# Patient Record
Sex: FEMALE | Race: OTHER | NOT HISPANIC OR LATINO
[De-identification: names, ages, dates, MRNs, and addresses within clinical notes are randomized per-mention and may not be internally consistent; named-entity substitution may affect disease eponyms.]

---

## 2021-11-03 ENCOUNTER — TRANSCRIPTION ENCOUNTER (OUTPATIENT)
Age: 61
End: 2021-11-03

## 2021-11-24 ENCOUNTER — APPOINTMENT (OUTPATIENT)
Dept: FAMILY MEDICINE | Facility: CLINIC | Age: 61
End: 2021-11-24

## 2023-05-03 ENCOUNTER — EMERGENCY (EMERGENCY)
Facility: HOSPITAL | Age: 63
LOS: 1 days | Discharge: HOME | End: 2023-05-03
Attending: EMERGENCY MEDICINE
Payer: MEDICAID

## 2023-05-03 VITALS
DIASTOLIC BLOOD PRESSURE: 74 MMHG | HEART RATE: 80 BPM | SYSTOLIC BLOOD PRESSURE: 122 MMHG | OXYGEN SATURATION: 99 % | TEMPERATURE: 98 F | WEIGHT: 207.9 LBS | HEIGHT: 64 IN | RESPIRATION RATE: 20 BRPM

## 2023-05-03 VITALS
DIASTOLIC BLOOD PRESSURE: 80 MMHG | HEART RATE: 76 BPM | TEMPERATURE: 98 F | SYSTOLIC BLOOD PRESSURE: 128 MMHG | RESPIRATION RATE: 18 BRPM | OXYGEN SATURATION: 98 %

## 2023-05-03 LAB
ALBUMIN SERPL ELPH-MCNC: 3.6 G/DL — SIGNIFICANT CHANGE UP (ref 3.3–5.2)
ALP SERPL-CCNC: 167 U/L — HIGH (ref 40–120)
ALT FLD-CCNC: 183 U/L — HIGH
ANION GAP SERPL CALC-SCNC: 11 MMOL/L — SIGNIFICANT CHANGE UP (ref 5–17)
AST SERPL-CCNC: 186 U/L — HIGH
BASOPHILS # BLD AUTO: 0.01 K/UL — SIGNIFICANT CHANGE UP (ref 0–0.2)
BASOPHILS NFR BLD AUTO: 0.1 % — SIGNIFICANT CHANGE UP (ref 0–2)
BILIRUB SERPL-MCNC: 4.4 MG/DL — HIGH (ref 0.4–2)
BUN SERPL-MCNC: 14.2 MG/DL — SIGNIFICANT CHANGE UP (ref 8–20)
CALCIUM SERPL-MCNC: 9.2 MG/DL — SIGNIFICANT CHANGE UP (ref 8.4–10.5)
CHLORIDE SERPL-SCNC: 105 MMOL/L — SIGNIFICANT CHANGE UP (ref 96–108)
CO2 SERPL-SCNC: 22 MMOL/L — SIGNIFICANT CHANGE UP (ref 22–29)
CREAT SERPL-MCNC: 0.95 MG/DL — SIGNIFICANT CHANGE UP (ref 0.5–1.3)
EGFR: 67 ML/MIN/1.73M2 — SIGNIFICANT CHANGE UP
EOSINOPHIL # BLD AUTO: 0.24 K/UL — SIGNIFICANT CHANGE UP (ref 0–0.5)
EOSINOPHIL NFR BLD AUTO: 2.7 % — SIGNIFICANT CHANGE UP (ref 0–6)
GLUCOSE SERPL-MCNC: 96 MG/DL — SIGNIFICANT CHANGE UP (ref 70–99)
HCT VFR BLD CALC: 40.2 % — SIGNIFICANT CHANGE UP (ref 34.5–45)
HGB BLD-MCNC: 12.9 G/DL — SIGNIFICANT CHANGE UP (ref 11.5–15.5)
IMM GRANULOCYTES NFR BLD AUTO: 0.3 % — SIGNIFICANT CHANGE UP (ref 0–0.9)
LIDOCAIN IGE QN: 232 U/L — HIGH (ref 22–51)
LYMPHOCYTES # BLD AUTO: 0.71 K/UL — LOW (ref 1–3.3)
LYMPHOCYTES # BLD AUTO: 8 % — LOW (ref 13–44)
MCHC RBC-ENTMCNC: 27.7 PG — SIGNIFICANT CHANGE UP (ref 27–34)
MCHC RBC-ENTMCNC: 32.1 GM/DL — SIGNIFICANT CHANGE UP (ref 32–36)
MCV RBC AUTO: 86.5 FL — SIGNIFICANT CHANGE UP (ref 80–100)
MONOCYTES # BLD AUTO: 0.44 K/UL — SIGNIFICANT CHANGE UP (ref 0–0.9)
MONOCYTES NFR BLD AUTO: 5 % — SIGNIFICANT CHANGE UP (ref 2–14)
NEUTROPHILS # BLD AUTO: 7.42 K/UL — HIGH (ref 1.8–7.4)
NEUTROPHILS NFR BLD AUTO: 83.9 % — HIGH (ref 43–77)
PLATELET # BLD AUTO: 234 K/UL — SIGNIFICANT CHANGE UP (ref 150–400)
POTASSIUM SERPL-MCNC: 4.1 MMOL/L — SIGNIFICANT CHANGE UP (ref 3.5–5.3)
POTASSIUM SERPL-SCNC: 4.1 MMOL/L — SIGNIFICANT CHANGE UP (ref 3.5–5.3)
PROT SERPL-MCNC: 7 G/DL — SIGNIFICANT CHANGE UP (ref 6.6–8.7)
RBC # BLD: 4.65 M/UL — SIGNIFICANT CHANGE UP (ref 3.8–5.2)
RBC # FLD: 14.7 % — HIGH (ref 10.3–14.5)
SODIUM SERPL-SCNC: 138 MMOL/L — SIGNIFICANT CHANGE UP (ref 135–145)
WBC # BLD: 8.85 K/UL — SIGNIFICANT CHANGE UP (ref 3.8–10.5)
WBC # FLD AUTO: 8.85 K/UL — SIGNIFICANT CHANGE UP (ref 3.8–10.5)

## 2023-05-03 PROCEDURE — 36415 COLL VENOUS BLD VENIPUNCTURE: CPT

## 2023-05-03 PROCEDURE — 96375 TX/PRO/DX INJ NEW DRUG ADDON: CPT

## 2023-05-03 PROCEDURE — 93005 ELECTROCARDIOGRAM TRACING: CPT

## 2023-05-03 PROCEDURE — 96374 THER/PROPH/DIAG INJ IV PUSH: CPT | Mod: XU

## 2023-05-03 PROCEDURE — 74177 CT ABD & PELVIS W/CONTRAST: CPT | Mod: ME

## 2023-05-03 PROCEDURE — 74177 CT ABD & PELVIS W/CONTRAST: CPT | Mod: 26,ME

## 2023-05-03 PROCEDURE — 85025 COMPLETE CBC W/AUTO DIFF WBC: CPT

## 2023-05-03 PROCEDURE — 96361 HYDRATE IV INFUSION ADD-ON: CPT

## 2023-05-03 PROCEDURE — 82962 GLUCOSE BLOOD TEST: CPT

## 2023-05-03 PROCEDURE — 83690 ASSAY OF LIPASE: CPT

## 2023-05-03 PROCEDURE — G1004: CPT

## 2023-05-03 PROCEDURE — 80053 COMPREHEN METABOLIC PANEL: CPT

## 2023-05-03 PROCEDURE — 93010 ELECTROCARDIOGRAM REPORT: CPT

## 2023-05-03 PROCEDURE — 99284 EMERGENCY DEPT VISIT MOD MDM: CPT

## 2023-05-03 PROCEDURE — 99285 EMERGENCY DEPT VISIT HI MDM: CPT | Mod: 25

## 2023-05-03 RX ORDER — SODIUM CHLORIDE 9 MG/ML
1000 INJECTION INTRAMUSCULAR; INTRAVENOUS; SUBCUTANEOUS ONCE
Refills: 0 | Status: COMPLETED | OUTPATIENT
Start: 2023-05-03 | End: 2023-05-03

## 2023-05-03 RX ORDER — ONDANSETRON 8 MG/1
1 TABLET, FILM COATED ORAL
Qty: 16 | Refills: 0
Start: 2023-05-03 | End: 2023-05-06

## 2023-05-03 RX ORDER — METOCLOPRAMIDE HCL 10 MG
10 TABLET ORAL ONCE
Refills: 0 | Status: COMPLETED | OUTPATIENT
Start: 2023-05-03 | End: 2023-05-03

## 2023-05-03 RX ORDER — ACETAMINOPHEN 500 MG
1000 TABLET ORAL ONCE
Refills: 0 | Status: COMPLETED | OUTPATIENT
Start: 2023-05-03 | End: 2023-05-03

## 2023-05-03 RX ORDER — PANTOPRAZOLE SODIUM 20 MG/1
40 TABLET, DELAYED RELEASE ORAL ONCE
Refills: 0 | Status: COMPLETED | OUTPATIENT
Start: 2023-05-03 | End: 2023-05-03

## 2023-05-03 RX ADMIN — Medication 400 MILLIGRAM(S): at 12:03

## 2023-05-03 RX ADMIN — Medication 10 MILLIGRAM(S): at 11:58

## 2023-05-03 RX ADMIN — PANTOPRAZOLE SODIUM 40 MILLIGRAM(S): 20 TABLET, DELAYED RELEASE ORAL at 10:07

## 2023-05-03 RX ADMIN — SODIUM CHLORIDE 1000 MILLILITER(S): 9 INJECTION INTRAMUSCULAR; INTRAVENOUS; SUBCUTANEOUS at 10:07

## 2023-05-03 NOTE — ED ADULT NURSE NOTE - OBJECTIVE STATEMENT
Pt c/o epigastric pain accompanied by nausea, vomiting, diarrhea.  States several sick contacts at home.

## 2023-05-03 NOTE — ED PROVIDER NOTE - DIFFERENTIAL DIAGNOSIS
Differential Diagnosis gastritis, pancreatitis, appendicitis, cholecystitis, colitis, electrolyte imbalance, anemia

## 2023-05-03 NOTE — ED PROVIDER NOTE - PATIENT PORTAL LINK FT
You can access the FollowMyHealth Patient Portal offered by [facility] by registering at the following website: [email or web address]. By joining [facility]’s FollowMyHealth portal, you will also be able to view your health information using other applications (apps) compatible with our system.

## 2023-05-03 NOTE — ED PROVIDER NOTE - NSFOLLOWUPINSTRUCTIONS_ED_ALL_ED_FT
Continue with Clear fluids.   Toast , Banana and soups   Followup with Primary Care Physician as discussed

## 2023-05-03 NOTE — ED PROVIDER NOTE - NS ED ATTENDING STATEMENT MOD
This was a shared visit with the JUNI. I reviewed and verified the documentation and independently performed the documented:

## 2023-05-03 NOTE — ED PROVIDER NOTE - NSICDXPASTSURGICALHX_GEN_ALL_CORE_FT
PAST SURGICAL HISTORY:  S/P Arthroscopic Surgery of Left Knee     S/P Breast Biopsy, Left     S/P D&C

## 2023-05-03 NOTE — ED ADULT TRIAGE NOTE - CHIEF COMPLAINT QUOTE
Patient with nausea/vomiting and diarrhea x2 days, known stomach bug at home. c/o epigastric discomfort.

## 2024-08-18 ENCOUNTER — EMERGENCY (EMERGENCY)
Facility: HOSPITAL | Age: 64
LOS: 1 days | Discharge: HOME | End: 2024-08-18
Attending: EMERGENCY MEDICINE
Payer: MEDICARE

## 2024-08-18 VITALS
TEMPERATURE: 98 F | SYSTOLIC BLOOD PRESSURE: 139 MMHG | OXYGEN SATURATION: 97 % | HEART RATE: 98 BPM | DIASTOLIC BLOOD PRESSURE: 86 MMHG | RESPIRATION RATE: 19 BRPM

## 2024-08-18 VITALS
SYSTOLIC BLOOD PRESSURE: 146 MMHG | RESPIRATION RATE: 18 BRPM | HEART RATE: 91 BPM | DIASTOLIC BLOOD PRESSURE: 86 MMHG | TEMPERATURE: 98 F | OXYGEN SATURATION: 96 %

## 2024-08-18 LAB
ALBUMIN SERPL ELPH-MCNC: 3.6 G/DL — SIGNIFICANT CHANGE UP (ref 3.3–5.2)
ALP SERPL-CCNC: 99 U/L — SIGNIFICANT CHANGE UP (ref 40–120)
ALT FLD-CCNC: 10 U/L — SIGNIFICANT CHANGE UP
ANION GAP SERPL CALC-SCNC: 10 MMOL/L — SIGNIFICANT CHANGE UP (ref 5–17)
APTT BLD: 31.4 SEC — SIGNIFICANT CHANGE UP (ref 24.5–35.6)
AST SERPL-CCNC: 18 U/L — SIGNIFICANT CHANGE UP
BASOPHILS # BLD AUTO: 0.03 K/UL — SIGNIFICANT CHANGE UP (ref 0–0.2)
BASOPHILS NFR BLD AUTO: 0.4 % — SIGNIFICANT CHANGE UP (ref 0–2)
BILIRUB SERPL-MCNC: 0.4 MG/DL — SIGNIFICANT CHANGE UP (ref 0.4–2)
BUN SERPL-MCNC: 11.1 MG/DL — SIGNIFICANT CHANGE UP (ref 8–20)
CALCIUM SERPL-MCNC: 9.3 MG/DL — SIGNIFICANT CHANGE UP (ref 8.4–10.5)
CHLORIDE SERPL-SCNC: 107 MMOL/L — SIGNIFICANT CHANGE UP (ref 96–108)
CO2 SERPL-SCNC: 22 MMOL/L — SIGNIFICANT CHANGE UP (ref 22–29)
CREAT SERPL-MCNC: 0.86 MG/DL — SIGNIFICANT CHANGE UP (ref 0.5–1.3)
EGFR: 75 ML/MIN/1.73M2 — SIGNIFICANT CHANGE UP
EGFR: 75 ML/MIN/1.73M2 — SIGNIFICANT CHANGE UP
EOSINOPHIL # BLD AUTO: 0.25 K/UL — SIGNIFICANT CHANGE UP (ref 0–0.5)
EOSINOPHIL NFR BLD AUTO: 3.4 % — SIGNIFICANT CHANGE UP (ref 0–6)
FLUAV AG NPH QL: SIGNIFICANT CHANGE UP
FLUBV AG NPH QL: SIGNIFICANT CHANGE UP
GLUCOSE SERPL-MCNC: 83 MG/DL — SIGNIFICANT CHANGE UP (ref 70–99)
HCT VFR BLD CALC: 40.5 % — SIGNIFICANT CHANGE UP (ref 34.5–45)
HGB BLD-MCNC: 12.6 G/DL — SIGNIFICANT CHANGE UP (ref 11.5–15.5)
IMM GRANULOCYTES NFR BLD AUTO: 0.3 % — SIGNIFICANT CHANGE UP (ref 0–0.9)
INR BLD: 1.1 RATIO — SIGNIFICANT CHANGE UP (ref 0.85–1.18)
LYMPHOCYTES # BLD AUTO: 2.28 K/UL — SIGNIFICANT CHANGE UP (ref 1–3.3)
LYMPHOCYTES # BLD AUTO: 30.6 % — SIGNIFICANT CHANGE UP (ref 13–44)
MCHC RBC-ENTMCNC: 26.8 PG — LOW (ref 27–34)
MCHC RBC-ENTMCNC: 31.1 GM/DL — LOW (ref 32–36)
MCV RBC AUTO: 86 FL — SIGNIFICANT CHANGE UP (ref 80–100)
MONOCYTES # BLD AUTO: 0.47 K/UL — SIGNIFICANT CHANGE UP (ref 0–0.9)
MONOCYTES NFR BLD AUTO: 6.3 % — SIGNIFICANT CHANGE UP (ref 2–14)
NEUTROPHILS # BLD AUTO: 4.41 K/UL — SIGNIFICANT CHANGE UP (ref 1.8–7.4)
NEUTROPHILS NFR BLD AUTO: 59 % — SIGNIFICANT CHANGE UP (ref 43–77)
NT-PROBNP SERPL-SCNC: 79 PG/ML — SIGNIFICANT CHANGE UP (ref 0–300)
PLATELET # BLD AUTO: 249 K/UL — SIGNIFICANT CHANGE UP (ref 150–400)
POTASSIUM SERPL-MCNC: 4.6 MMOL/L — SIGNIFICANT CHANGE UP (ref 3.5–5.3)
POTASSIUM SERPL-SCNC: 4.6 MMOL/L — SIGNIFICANT CHANGE UP (ref 3.5–5.3)
PROT SERPL-MCNC: 7.3 G/DL — SIGNIFICANT CHANGE UP (ref 6.6–8.7)
PROTHROM AB SERPL-ACNC: 12.2 SEC — SIGNIFICANT CHANGE UP (ref 9.5–13)
RBC # BLD: 4.71 M/UL — SIGNIFICANT CHANGE UP (ref 3.8–5.2)
RBC # FLD: 13.9 % — SIGNIFICANT CHANGE UP (ref 10.3–14.5)
RSV RNA NPH QL NAA+NON-PROBE: SIGNIFICANT CHANGE UP
SARS-COV-2 RNA SPEC QL NAA+PROBE: SIGNIFICANT CHANGE UP
SODIUM SERPL-SCNC: 139 MMOL/L — SIGNIFICANT CHANGE UP (ref 135–145)
TROPONIN T, HIGH SENSITIVITY RESULT: 11 NG/L — SIGNIFICANT CHANGE UP (ref 0–51)
WBC # BLD: 7.46 K/UL — SIGNIFICANT CHANGE UP (ref 3.8–10.5)
WBC # FLD AUTO: 7.46 K/UL — SIGNIFICANT CHANGE UP (ref 3.8–10.5)

## 2024-08-18 PROCEDURE — 84484 ASSAY OF TROPONIN QUANT: CPT

## 2024-08-18 PROCEDURE — 93005 ELECTROCARDIOGRAM TRACING: CPT

## 2024-08-18 PROCEDURE — 93970 EXTREMITY STUDY: CPT

## 2024-08-18 PROCEDURE — 85025 COMPLETE CBC W/AUTO DIFF WBC: CPT

## 2024-08-18 PROCEDURE — 71046 X-RAY EXAM CHEST 2 VIEWS: CPT

## 2024-08-18 PROCEDURE — 87637 SARSCOV2&INF A&B&RSV AMP PRB: CPT

## 2024-08-18 PROCEDURE — 94640 AIRWAY INHALATION TREATMENT: CPT

## 2024-08-18 PROCEDURE — 36415 COLL VENOUS BLD VENIPUNCTURE: CPT

## 2024-08-18 PROCEDURE — 85730 THROMBOPLASTIN TIME PARTIAL: CPT

## 2024-08-18 PROCEDURE — 96374 THER/PROPH/DIAG INJ IV PUSH: CPT

## 2024-08-18 PROCEDURE — 80053 COMPREHEN METABOLIC PANEL: CPT

## 2024-08-18 PROCEDURE — 83880 ASSAY OF NATRIURETIC PEPTIDE: CPT

## 2024-08-18 PROCEDURE — 99285 EMERGENCY DEPT VISIT HI MDM: CPT | Mod: 25

## 2024-08-18 PROCEDURE — 85610 PROTHROMBIN TIME: CPT

## 2024-08-18 PROCEDURE — 71046 X-RAY EXAM CHEST 2 VIEWS: CPT | Mod: 26

## 2024-08-18 PROCEDURE — 93970 EXTREMITY STUDY: CPT | Mod: 26

## 2024-08-18 PROCEDURE — 96375 TX/PRO/DX INJ NEW DRUG ADDON: CPT

## 2024-08-18 PROCEDURE — 93010 ELECTROCARDIOGRAM REPORT: CPT

## 2024-08-18 PROCEDURE — 99285 EMERGENCY DEPT VISIT HI MDM: CPT

## 2024-08-18 RX ORDER — IPRATROPIUM BROMIDE AND ALBUTEROL SULFATE .5; 2.5 MG/3ML; MG/3ML
3 SOLUTION RESPIRATORY (INHALATION) EVERY 6 HOURS
Refills: 0 | Status: DISCONTINUED | OUTPATIENT
Start: 2024-08-18 | End: 2024-08-18

## 2024-08-18 RX ORDER — AMLODIPINE BESYLATE 10 MG/1
0 TABLET ORAL
Refills: 0 | DISCHARGE

## 2024-08-18 RX ORDER — IPRATROPIUM BROMIDE AND ALBUTEROL SULFATE .5; 2.5 MG/3ML; MG/3ML
3 SOLUTION RESPIRATORY (INHALATION) ONCE
Refills: 0 | Status: COMPLETED | OUTPATIENT
Start: 2024-08-18 | End: 2024-08-18

## 2024-08-18 RX ORDER — METOPROLOL SUCCINATE 50 MG/1
100 TABLET, EXTENDED RELEASE ORAL ONCE
Refills: 0 | Status: DISCONTINUED | OUTPATIENT
Start: 2024-08-18 | End: 2024-08-18

## 2024-08-18 RX ORDER — LISINOPRIL 5 MG/1
40 TABLET ORAL DAILY
Refills: 0 | Status: DISCONTINUED | OUTPATIENT
Start: 2024-08-18 | End: 2024-08-25

## 2024-08-18 RX ORDER — METHYLPREDNISOLONE ACETATE 80 MG/ML
0 INJECTION, SUSPENSION INTRA-ARTICULAR; INTRALESIONAL; INTRAMUSCULAR; SOFT TISSUE
Refills: 0
Start: 2024-08-18

## 2024-08-18 RX ORDER — METHYLPREDNISOLONE ACETATE 80 MG/ML
125 INJECTION, SUSPENSION INTRA-ARTICULAR; INTRALESIONAL; INTRAMUSCULAR; SOFT TISSUE ONCE
Refills: 0 | Status: COMPLETED | OUTPATIENT
Start: 2024-08-18 | End: 2024-08-18

## 2024-08-18 RX ORDER — KETOROLAC TROMETHAMINE 30 MG/ML
15 INJECTION, SOLUTION INTRAMUSCULAR; INTRAVENOUS ONCE
Refills: 0 | Status: DISCONTINUED | OUTPATIENT
Start: 2024-08-18 | End: 2024-08-18

## 2024-08-18 RX ORDER — PREDNISONE 20 MG/1
1 TABLET ORAL
Qty: 4 | Refills: 0
Start: 2024-08-18 | End: 2024-08-21

## 2024-08-18 RX ORDER — AMLODIPINE BESYLATE 10 MG/1
10 TABLET ORAL ONCE
Refills: 0 | Status: COMPLETED | OUTPATIENT
Start: 2024-08-18 | End: 2024-08-18

## 2024-08-18 RX ADMIN — METHYLPREDNISOLONE ACETATE 125 MILLIGRAM(S): 80 INJECTION, SUSPENSION INTRA-ARTICULAR; INTRALESIONAL; INTRAMUSCULAR; SOFT TISSUE at 13:03

## 2024-08-18 RX ADMIN — IPRATROPIUM BROMIDE AND ALBUTEROL SULFATE 3 MILLILITER(S): .5; 2.5 SOLUTION RESPIRATORY (INHALATION) at 13:03

## 2024-08-18 RX ADMIN — AMLODIPINE BESYLATE 10 MILLIGRAM(S): 10 TABLET ORAL at 18:04

## 2024-08-18 RX ADMIN — LISINOPRIL 40 MILLIGRAM(S): 5 TABLET ORAL at 18:04

## 2024-08-18 RX ADMIN — IPRATROPIUM BROMIDE AND ALBUTEROL SULFATE 3 MILLILITER(S): .5; 2.5 SOLUTION RESPIRATORY (INHALATION) at 16:08

## 2024-08-18 RX ADMIN — KETOROLAC TROMETHAMINE 15 MILLIGRAM(S): 30 INJECTION, SOLUTION INTRAMUSCULAR; INTRAVENOUS at 18:03

## 2024-08-19 RX ORDER — PREDNISONE 20 MG/1
2 TABLET ORAL
Qty: 10 | Refills: 0
Start: 2024-08-19 | End: 2024-08-23

## 2024-08-22 DIAGNOSIS — J45.909 UNSPECIFIED ASTHMA, UNCOMPLICATED: ICD-10-CM

## 2024-08-22 DIAGNOSIS — Z87.891 PERSONAL HISTORY OF NICOTINE DEPENDENCE: ICD-10-CM

## 2024-08-22 DIAGNOSIS — M06.9 RHEUMATOID ARTHRITIS, UNSPECIFIED: ICD-10-CM

## 2024-08-22 DIAGNOSIS — I10 ESSENTIAL (PRIMARY) HYPERTENSION: ICD-10-CM

## 2024-08-22 DIAGNOSIS — R06.02 SHORTNESS OF BREATH: ICD-10-CM

## 2024-11-16 ENCOUNTER — EMERGENCY (EMERGENCY)
Facility: HOSPITAL | Age: 64
LOS: 1 days | Discharge: HOME | End: 2024-11-16
Attending: EMERGENCY MEDICINE
Payer: MEDICARE

## 2024-11-16 VITALS
RESPIRATION RATE: 18 BRPM | HEART RATE: 94 BPM | OXYGEN SATURATION: 97 % | TEMPERATURE: 98 F | SYSTOLIC BLOOD PRESSURE: 137 MMHG | DIASTOLIC BLOOD PRESSURE: 80 MMHG

## 2024-11-16 VITALS
HEIGHT: 66 IN | WEIGHT: 175.05 LBS | SYSTOLIC BLOOD PRESSURE: 147 MMHG | OXYGEN SATURATION: 98 % | HEART RATE: 99 BPM | RESPIRATION RATE: 20 BRPM | TEMPERATURE: 98 F | DIASTOLIC BLOOD PRESSURE: 94 MMHG

## 2024-11-16 LAB
ANION GAP SERPL CALC-SCNC: 13 MMOL/L — SIGNIFICANT CHANGE UP (ref 5–17)
BASOPHILS # BLD AUTO: 0 K/UL — SIGNIFICANT CHANGE UP (ref 0–0.2)
BASOPHILS NFR BLD AUTO: 0 % — SIGNIFICANT CHANGE UP (ref 0–2)
BUN SERPL-MCNC: 11.5 MG/DL — SIGNIFICANT CHANGE UP (ref 8–20)
CALCIUM SERPL-MCNC: 9.8 MG/DL — SIGNIFICANT CHANGE UP (ref 8.4–10.5)
CHLORIDE SERPL-SCNC: 110 MMOL/L — HIGH (ref 96–108)
CO2 SERPL-SCNC: 23 MMOL/L — SIGNIFICANT CHANGE UP (ref 22–29)
CREAT SERPL-MCNC: 0.88 MG/DL — SIGNIFICANT CHANGE UP (ref 0.5–1.3)
EGFR: 73 ML/MIN/1.73M2 — SIGNIFICANT CHANGE UP
EGFR: 73 ML/MIN/1.73M2 — SIGNIFICANT CHANGE UP
EOSINOPHIL # BLD AUTO: 0 K/UL — SIGNIFICANT CHANGE UP (ref 0–0.5)
EOSINOPHIL NFR BLD AUTO: 0 % — SIGNIFICANT CHANGE UP (ref 0–6)
FLUAV AG NPH QL: SIGNIFICANT CHANGE UP
FLUBV AG NPH QL: SIGNIFICANT CHANGE UP
GLUCOSE SERPL-MCNC: 187 MG/DL — HIGH (ref 70–99)
HCT VFR BLD CALC: 40.5 % — SIGNIFICANT CHANGE UP (ref 34.5–45)
HGB BLD-MCNC: 12.5 G/DL — SIGNIFICANT CHANGE UP (ref 11.5–15.5)
LYMPHOCYTES # BLD AUTO: 0.89 K/UL — LOW (ref 1–3.3)
LYMPHOCYTES # BLD AUTO: 13.3 % — SIGNIFICANT CHANGE UP (ref 13–44)
MANUAL SMEAR VERIFICATION: SIGNIFICANT CHANGE UP
MCHC RBC-ENTMCNC: 26.7 PG — LOW (ref 27–34)
MCHC RBC-ENTMCNC: 30.9 G/DL — LOW (ref 32–36)
MCV RBC AUTO: 86.4 FL — SIGNIFICANT CHANGE UP (ref 80–100)
MONOCYTES # BLD AUTO: 0.11 K/UL — SIGNIFICANT CHANGE UP (ref 0–0.9)
MONOCYTES NFR BLD AUTO: 1.7 % — LOW (ref 2–14)
NEUTROPHILS # BLD AUTO: 5.65 K/UL — SIGNIFICANT CHANGE UP (ref 1.8–7.4)
NEUTROPHILS NFR BLD AUTO: 84.1 % — HIGH (ref 43–77)
PLAT MORPH BLD: NORMAL — SIGNIFICANT CHANGE UP
PLATELET # BLD AUTO: 278 K/UL — SIGNIFICANT CHANGE UP (ref 150–400)
POTASSIUM SERPL-MCNC: 4.5 MMOL/L — SIGNIFICANT CHANGE UP (ref 3.5–5.3)
POTASSIUM SERPL-SCNC: 4.5 MMOL/L — SIGNIFICANT CHANGE UP (ref 3.5–5.3)
RBC # BLD: 4.69 M/UL — SIGNIFICANT CHANGE UP (ref 3.8–5.2)
RBC # FLD: 14.6 % — HIGH (ref 10.3–14.5)
RBC BLD AUTO: NORMAL — SIGNIFICANT CHANGE UP
RSV RNA NPH QL NAA+NON-PROBE: SIGNIFICANT CHANGE UP
SARS-COV-2 RNA SPEC QL NAA+PROBE: SIGNIFICANT CHANGE UP
SODIUM SERPL-SCNC: 146 MMOL/L — HIGH (ref 135–145)
VARIANT LYMPHS # BLD: 0.9 % — SIGNIFICANT CHANGE UP (ref 0–6)
VARIANT LYMPHS NFR BLD MANUAL: 0.9 % — SIGNIFICANT CHANGE UP (ref 0–6)
WBC # BLD: 6.72 K/UL — SIGNIFICANT CHANGE UP (ref 3.8–10.5)
WBC # FLD AUTO: 6.72 K/UL — SIGNIFICANT CHANGE UP (ref 3.8–10.5)

## 2024-11-16 PROCEDURE — 99285 EMERGENCY DEPT VISIT HI MDM: CPT

## 2024-11-16 PROCEDURE — 93010 ELECTROCARDIOGRAM REPORT: CPT

## 2024-11-16 RX ORDER — IPRATROPIUM BROMIDE AND ALBUTEROL SULFATE .5; 2.5 MG/3ML; MG/3ML
3 SOLUTION RESPIRATORY (INHALATION)
Refills: 0 | Status: COMPLETED | OUTPATIENT
Start: 2024-11-16 | End: 2024-11-16

## 2024-11-16 RX ORDER — MAGNESIUM SULFATE 500 MG/ML
2 SYRINGE (ML) INJECTION ONCE
Refills: 0 | Status: COMPLETED | OUTPATIENT
Start: 2024-11-16 | End: 2024-11-16

## 2024-11-16 RX ORDER — METHYLPREDNISOLONE ACETATE 80 MG/ML
125 INJECTION, SUSPENSION INTRA-ARTICULAR; INTRALESIONAL; INTRAMUSCULAR; SOFT TISSUE ONCE
Refills: 0 | Status: COMPLETED | OUTPATIENT
Start: 2024-11-16 | End: 2024-11-16

## 2024-11-16 RX ADMIN — Medication 150 GRAM(S): at 22:37

## 2024-11-16 RX ADMIN — IPRATROPIUM BROMIDE AND ALBUTEROL SULFATE 3 MILLILITER(S): .5; 2.5 SOLUTION RESPIRATORY (INHALATION) at 22:37

## 2024-11-16 RX ADMIN — IPRATROPIUM BROMIDE AND ALBUTEROL SULFATE 3 MILLILITER(S): .5; 2.5 SOLUTION RESPIRATORY (INHALATION) at 23:17

## 2024-11-16 RX ADMIN — METHYLPREDNISOLONE ACETATE 125 MILLIGRAM(S): 80 INJECTION, SUSPENSION INTRA-ARTICULAR; INTRALESIONAL; INTRAMUSCULAR; SOFT TISSUE at 22:37

## 2024-11-16 RX ADMIN — Medication 2 GRAM(S): at 23:20

## 2024-11-17 PROCEDURE — 99285 EMERGENCY DEPT VISIT HI MDM: CPT | Mod: 25

## 2024-11-17 PROCEDURE — 93005 ELECTROCARDIOGRAM TRACING: CPT

## 2024-11-17 PROCEDURE — 96375 TX/PRO/DX INJ NEW DRUG ADDON: CPT

## 2024-11-17 PROCEDURE — 36415 COLL VENOUS BLD VENIPUNCTURE: CPT

## 2024-11-17 PROCEDURE — 96365 THER/PROPH/DIAG IV INF INIT: CPT

## 2024-11-17 PROCEDURE — 94640 AIRWAY INHALATION TREATMENT: CPT

## 2024-11-17 PROCEDURE — 80048 BASIC METABOLIC PNL TOTAL CA: CPT

## 2024-11-17 PROCEDURE — 71046 X-RAY EXAM CHEST 2 VIEWS: CPT | Mod: 26

## 2024-11-17 PROCEDURE — 85025 COMPLETE CBC W/AUTO DIFF WBC: CPT

## 2024-11-17 PROCEDURE — 71046 X-RAY EXAM CHEST 2 VIEWS: CPT

## 2024-11-17 PROCEDURE — 87637 SARSCOV2&INF A&B&RSV AMP PRB: CPT

## 2024-11-17 RX ORDER — LIDOCAINE HYDROCHLORIDE 20 MG/ML
5 JELLY TOPICAL ONCE
Refills: 0 | Status: COMPLETED | OUTPATIENT
Start: 2024-11-17 | End: 2024-11-17

## 2024-11-17 RX ORDER — DEXTROMETHORPHAN HBR, GUAIFENESIN 200 MG/10ML
200 LIQUID ORAL ONCE
Refills: 0 | Status: COMPLETED | OUTPATIENT
Start: 2024-11-17 | End: 2024-11-17

## 2024-11-17 RX ADMIN — Medication 4 MILLILITER(S): at 01:59

## 2024-11-17 RX ADMIN — LIDOCAINE HYDROCHLORIDE 5 MILLILITER(S): 20 JELLY TOPICAL at 01:58

## 2024-11-17 RX ADMIN — DEXTROMETHORPHAN HBR, GUAIFENESIN 200 MILLIGRAM(S): 200 LIQUID ORAL at 01:58

## 2024-11-17 RX ADMIN — Medication 10 MILLIGRAM(S): at 01:59

## 2024-11-17 RX ADMIN — Medication 1 LOZENGE: at 03:02

## 2024-11-17 RX ADMIN — IPRATROPIUM BROMIDE AND ALBUTEROL SULFATE 3 MILLILITER(S): .5; 2.5 SOLUTION RESPIRATORY (INHALATION) at 00:07

## 2025-01-09 ENCOUNTER — APPOINTMENT (OUTPATIENT)
Dept: CARDIOLOGY | Facility: CLINIC | Age: 65
End: 2025-01-09
Payer: MEDICARE

## 2025-01-09 VITALS — DIASTOLIC BLOOD PRESSURE: 90 MMHG | SYSTOLIC BLOOD PRESSURE: 140 MMHG

## 2025-01-09 VITALS
HEIGHT: 64 IN | WEIGHT: 198 LBS | OXYGEN SATURATION: 96 % | DIASTOLIC BLOOD PRESSURE: 84 MMHG | BODY MASS INDEX: 33.8 KG/M2 | HEART RATE: 93 BPM | SYSTOLIC BLOOD PRESSURE: 132 MMHG

## 2025-01-09 DIAGNOSIS — Z00.00 ENCOUNTER FOR GENERAL ADULT MEDICAL EXAMINATION W/OUT ABNORMAL FINDINGS: ICD-10-CM

## 2025-01-09 DIAGNOSIS — F41.9 ANXIETY DISORDER, UNSPECIFIED: ICD-10-CM

## 2025-01-09 DIAGNOSIS — R06.09 OTHER FORMS OF DYSPNEA: ICD-10-CM

## 2025-01-09 DIAGNOSIS — I10 ESSENTIAL (PRIMARY) HYPERTENSION: ICD-10-CM

## 2025-01-09 PROCEDURE — 99205 OFFICE O/P NEW HI 60 MIN: CPT

## 2025-01-09 PROCEDURE — G2211 COMPLEX E/M VISIT ADD ON: CPT

## 2025-01-09 PROCEDURE — 93000 ELECTROCARDIOGRAM COMPLETE: CPT

## 2025-01-09 RX ORDER — FLUTICASONE PROPIONATE 50 UG/1
50 SPRAY, METERED NASAL
Refills: 0 | Status: ACTIVE | COMMUNITY
Start: 2025-01-09

## 2025-01-09 RX ORDER — MONTELUKAST SODIUM 10 MG/1
10 TABLET, FILM COATED ORAL DAILY
Refills: 0 | Status: ACTIVE | COMMUNITY
Start: 2025-01-09

## 2025-01-09 RX ORDER — ALBUTEROL SULFATE 90 UG/1
108 (90 BASE) AEROSOL, METERED RESPIRATORY (INHALATION)
Refills: 0 | Status: ACTIVE | COMMUNITY
Start: 2025-01-09

## 2025-01-09 RX ORDER — TRAMADOL HYDROCHLORIDE 50 MG/1
50 TABLET, COATED ORAL
Refills: 0 | Status: ACTIVE | COMMUNITY
Start: 2025-01-09

## 2025-01-09 RX ORDER — AMLODIPINE AND OLMESARTAN MEDOXOMIL 5; 20 MG/1; MG/1
5-20 TABLET ORAL
Qty: 90 | Refills: 3 | Status: ACTIVE | COMMUNITY
Start: 2025-01-09 | End: 1900-01-01

## 2025-01-09 RX ORDER — ALBUTEROL SULFATE 2.5 MG/3ML
(2.5 MG/3ML) SOLUTION RESPIRATORY (INHALATION)
Refills: 0 | Status: ACTIVE | COMMUNITY
Start: 2025-01-09

## 2025-01-09 RX ORDER — FLUOXETINE HYDROCHLORIDE 20 MG/1
20 TABLET ORAL
Refills: 0 | Status: ACTIVE | COMMUNITY
Start: 2025-01-09

## 2025-01-09 RX ORDER — METOPROLOL SUCCINATE 50 MG/1
50 TABLET, EXTENDED RELEASE ORAL
Qty: 180 | Refills: 3 | Status: ACTIVE | COMMUNITY
Start: 2025-01-09

## 2025-01-09 RX ORDER — VALSARTAN 40 MG/1
40 TABLET, COATED ORAL DAILY
Qty: 90 | Refills: 1 | Status: DISCONTINUED | COMMUNITY
Start: 2025-01-09 | End: 2025-01-09

## 2025-01-09 RX ORDER — GABAPENTIN 100 MG/1
100 CAPSULE ORAL TWICE DAILY
Refills: 0 | Status: ACTIVE | COMMUNITY
Start: 2025-01-09

## 2025-01-09 RX ORDER — SUMATRIPTAN 50 MG/1
50 TABLET, FILM COATED ORAL
Refills: 0 | Status: ACTIVE | COMMUNITY
Start: 2025-01-09

## 2025-01-09 RX ORDER — OMEPRAZOLE 40 MG/1
40 CAPSULE, DELAYED RELEASE ORAL
Refills: 0 | Status: ACTIVE | COMMUNITY
Start: 2025-01-09

## 2025-01-09 RX ORDER — LORATADINE 10 MG/1
10 TABLET ORAL
Refills: 0 | Status: ACTIVE | COMMUNITY
Start: 2025-01-09

## 2025-01-11 ENCOUNTER — NON-APPOINTMENT (OUTPATIENT)
Age: 65
End: 2025-01-11

## 2025-01-22 ENCOUNTER — APPOINTMENT (OUTPATIENT)
Dept: PULMONOLOGY | Facility: CLINIC | Age: 65
End: 2025-01-22
Payer: MEDICARE

## 2025-01-22 ENCOUNTER — NON-APPOINTMENT (OUTPATIENT)
Age: 65
End: 2025-01-22

## 2025-01-22 ENCOUNTER — TRANSCRIPTION ENCOUNTER (OUTPATIENT)
Age: 65
End: 2025-01-22

## 2025-01-22 VITALS
WEIGHT: 198 LBS | OXYGEN SATURATION: 95 % | HEIGHT: 64 IN | TEMPERATURE: 97.2 F | HEART RATE: 77 BPM | DIASTOLIC BLOOD PRESSURE: 93 MMHG | SYSTOLIC BLOOD PRESSURE: 155 MMHG | BODY MASS INDEX: 33.8 KG/M2

## 2025-01-22 DIAGNOSIS — R05.9 COUGH, UNSPECIFIED: ICD-10-CM

## 2025-01-22 DIAGNOSIS — J45.41 MODERATE PERSISTENT ASTHMA WITH (ACUTE) EXACERBATION: ICD-10-CM

## 2025-01-22 PROCEDURE — 99204 OFFICE O/P NEW MOD 45 MIN: CPT | Mod: 25

## 2025-01-22 PROCEDURE — 94010 BREATHING CAPACITY TEST: CPT

## 2025-01-22 RX ORDER — ALBUTEROL SULFATE 90 UG/1
108 (90 BASE) INHALANT RESPIRATORY (INHALATION)
Qty: 2 | Refills: 6 | Status: ACTIVE | COMMUNITY
Start: 2025-01-22 | End: 1900-01-01

## 2025-01-22 RX ORDER — AZITHROMYCIN 250 MG/1
250 TABLET, FILM COATED ORAL
Qty: 6 | Refills: 2 | Status: ACTIVE | COMMUNITY
Start: 2025-01-22 | End: 1900-01-01

## 2025-01-22 RX ORDER — PREDNISONE 5 MG/1
5 TABLET ORAL
Qty: 42 | Refills: 2 | Status: ACTIVE | COMMUNITY
Start: 2025-01-22 | End: 1900-01-01

## 2025-01-24 RX ORDER — BUDESONIDE AND FORMOTEROL FUMARATE DIHYDRATE 80; 4.5 UG/1; UG/1
80-4.5 AEROSOL RESPIRATORY (INHALATION) TWICE DAILY
Qty: 3 | Refills: 2 | Status: ACTIVE | COMMUNITY
Start: 2025-01-22 | End: 1900-01-01

## 2025-02-14 ENCOUNTER — APPOINTMENT (OUTPATIENT)
Dept: CARDIOLOGY | Facility: CLINIC | Age: 65
End: 2025-02-14

## 2025-02-25 ENCOUNTER — APPOINTMENT (OUTPATIENT)
Dept: CARDIOLOGY | Facility: CLINIC | Age: 65
End: 2025-02-25
Payer: MEDICARE

## 2025-02-25 PROCEDURE — 93306 TTE W/DOPPLER COMPLETE: CPT

## 2025-02-25 PROCEDURE — A9502: CPT

## 2025-02-25 PROCEDURE — 93015 CV STRESS TEST SUPVJ I&R: CPT

## 2025-02-25 PROCEDURE — 78452 HT MUSCLE IMAGE SPECT MULT: CPT

## 2025-02-25 RX ORDER — FEXOFENADINE HCL 180 MG
180 TABLET ORAL
Refills: 0 | Status: ACTIVE | COMMUNITY

## 2025-02-25 RX ORDER — LEFLUNOMIDE 20 MG/1
20 TABLET, FILM COATED ORAL DAILY
Refills: 0 | Status: ACTIVE | COMMUNITY

## 2025-02-25 RX ORDER — ONDANSETRON 8 MG/1
8 TABLET ORAL
Refills: 0 | Status: ACTIVE | COMMUNITY

## 2025-02-25 RX ORDER — TOPIRAMATE 25 MG/1
25 TABLET, FILM COATED ORAL
Refills: 0 | Status: ACTIVE | COMMUNITY

## 2025-03-05 ENCOUNTER — APPOINTMENT (OUTPATIENT)
Dept: PULMONOLOGY | Facility: CLINIC | Age: 65
End: 2025-03-05

## 2025-03-21 ENCOUNTER — TRANSCRIPTION ENCOUNTER (OUTPATIENT)
Age: 65
End: 2025-03-21

## 2025-04-01 ENCOUNTER — APPOINTMENT (OUTPATIENT)
Dept: PULMONOLOGY | Facility: CLINIC | Age: 65
End: 2025-04-01
Payer: MEDICARE

## 2025-04-01 DIAGNOSIS — J45.41 MODERATE PERSISTENT ASTHMA WITH (ACUTE) EXACERBATION: ICD-10-CM

## 2025-04-01 DIAGNOSIS — R05.9 COUGH, UNSPECIFIED: ICD-10-CM

## 2025-04-01 PROCEDURE — 99214 OFFICE O/P EST MOD 30 MIN: CPT | Mod: 93

## 2025-04-21 ENCOUNTER — RESULT REVIEW (OUTPATIENT)
Age: 65
End: 2025-04-21

## 2025-04-21 ENCOUNTER — INPATIENT (INPATIENT)
Facility: HOSPITAL | Age: 65
LOS: 3 days | Discharge: HOME | DRG: 204 | End: 2025-04-25
Attending: STUDENT IN AN ORGANIZED HEALTH CARE EDUCATION/TRAINING PROGRAM | Admitting: STUDENT IN AN ORGANIZED HEALTH CARE EDUCATION/TRAINING PROGRAM
Payer: MEDICARE

## 2025-04-21 VITALS
DIASTOLIC BLOOD PRESSURE: 83 MMHG | OXYGEN SATURATION: 99 % | RESPIRATION RATE: 18 BRPM | TEMPERATURE: 98 F | SYSTOLIC BLOOD PRESSURE: 133 MMHG | HEART RATE: 93 BPM | HEIGHT: 64 IN | WEIGHT: 186.07 LBS

## 2025-04-21 DIAGNOSIS — Z01.810 ENCOUNTER FOR PREPROCEDURAL CARDIOVASCULAR EXAMINATION: ICD-10-CM

## 2025-04-21 DIAGNOSIS — R91.8 OTHER NONSPECIFIC ABNORMAL FINDING OF LUNG FIELD: ICD-10-CM

## 2025-04-21 DIAGNOSIS — R09.89 OTHER SPECIFIED SYMPTOMS AND SIGNS INVOLVING THE CIRCULATORY AND RESPIRATORY SYSTEMS: ICD-10-CM

## 2025-04-21 LAB
ALBUMIN SERPL ELPH-MCNC: 3.9 G/DL — SIGNIFICANT CHANGE UP (ref 3.3–5.2)
ALP SERPL-CCNC: 104 U/L — SIGNIFICANT CHANGE UP (ref 40–120)
ALT FLD-CCNC: 9 U/L — SIGNIFICANT CHANGE UP
ANION GAP SERPL CALC-SCNC: 14 MMOL/L — SIGNIFICANT CHANGE UP (ref 5–17)
APTT BLD: 34.8 SEC — SIGNIFICANT CHANGE UP (ref 24.5–35.6)
AST SERPL-CCNC: 19 U/L — SIGNIFICANT CHANGE UP
BASOPHILS # BLD AUTO: 0.05 K/UL — SIGNIFICANT CHANGE UP (ref 0–0.2)
BASOPHILS NFR BLD AUTO: 0.6 % — SIGNIFICANT CHANGE UP (ref 0–2)
BILIRUB SERPL-MCNC: 0.4 MG/DL — SIGNIFICANT CHANGE UP (ref 0.4–2)
BUN SERPL-MCNC: 11.6 MG/DL — SIGNIFICANT CHANGE UP (ref 8–20)
CALCIUM SERPL-MCNC: 9.5 MG/DL — SIGNIFICANT CHANGE UP (ref 8.4–10.5)
CHLORIDE SERPL-SCNC: 110 MMOL/L — HIGH (ref 96–108)
CO2 SERPL-SCNC: 21 MMOL/L — LOW (ref 22–29)
CREAT SERPL-MCNC: 0.78 MG/DL — SIGNIFICANT CHANGE UP (ref 0.5–1.3)
EGFR: 84 ML/MIN/1.73M2 — SIGNIFICANT CHANGE UP
EGFR: 84 ML/MIN/1.73M2 — SIGNIFICANT CHANGE UP
EOSINOPHIL # BLD AUTO: 0.26 K/UL — SIGNIFICANT CHANGE UP (ref 0–0.5)
EOSINOPHIL NFR BLD AUTO: 3 % — SIGNIFICANT CHANGE UP (ref 0–6)
GLUCOSE SERPL-MCNC: 82 MG/DL — SIGNIFICANT CHANGE UP (ref 70–99)
HCT VFR BLD CALC: 39.1 % — SIGNIFICANT CHANGE UP (ref 34.5–45)
HGB BLD-MCNC: 12.2 G/DL — SIGNIFICANT CHANGE UP (ref 11.5–15.5)
IMM GRANULOCYTES # BLD AUTO: 0.03 K/UL — SIGNIFICANT CHANGE UP (ref 0–0.07)
IMM GRANULOCYTES NFR BLD AUTO: 0.4 % — SIGNIFICANT CHANGE UP (ref 0–0.9)
INR BLD: 1.12 RATIO — SIGNIFICANT CHANGE UP (ref 0.85–1.16)
LYMPHOCYTES # BLD AUTO: 2.67 K/UL — SIGNIFICANT CHANGE UP (ref 1–3.3)
LYMPHOCYTES NFR BLD AUTO: 31.3 % — SIGNIFICANT CHANGE UP (ref 13–44)
MCHC RBC-ENTMCNC: 26.8 PG — LOW (ref 27–34)
MCHC RBC-ENTMCNC: 31.2 G/DL — LOW (ref 32–36)
MCV RBC AUTO: 85.9 FL — SIGNIFICANT CHANGE UP (ref 80–100)
MONOCYTES # BLD AUTO: 0.46 K/UL — SIGNIFICANT CHANGE UP (ref 0–0.9)
MONOCYTES NFR BLD AUTO: 5.4 % — SIGNIFICANT CHANGE UP (ref 2–14)
NEUTROPHILS # BLD AUTO: 5.07 K/UL — SIGNIFICANT CHANGE UP (ref 1.8–7.4)
NEUTROPHILS NFR BLD AUTO: 59.3 % — SIGNIFICANT CHANGE UP (ref 43–77)
NRBC # BLD AUTO: 0 K/UL — SIGNIFICANT CHANGE UP (ref 0–0)
NRBC # FLD: 0 K/UL — SIGNIFICANT CHANGE UP (ref 0–0)
NRBC BLD AUTO-RTO: 0 /100 WBCS — SIGNIFICANT CHANGE UP (ref 0–0)
PLATELET # BLD AUTO: 256 K/UL — SIGNIFICANT CHANGE UP (ref 150–400)
PMV BLD: 11.9 FL — SIGNIFICANT CHANGE UP (ref 7–13)
POTASSIUM SERPL-MCNC: 3.9 MMOL/L — SIGNIFICANT CHANGE UP (ref 3.5–5.3)
POTASSIUM SERPL-SCNC: 3.9 MMOL/L — SIGNIFICANT CHANGE UP (ref 3.5–5.3)
PROT SERPL-MCNC: 7.4 G/DL — SIGNIFICANT CHANGE UP (ref 6.6–8.7)
PROTHROM AB SERPL-ACNC: 13 SEC — SIGNIFICANT CHANGE UP (ref 9.9–13.4)
RBC # BLD: 4.55 M/UL — SIGNIFICANT CHANGE UP (ref 3.8–5.2)
RBC # FLD: 14.8 % — HIGH (ref 10.3–14.5)
SODIUM SERPL-SCNC: 145 MMOL/L — SIGNIFICANT CHANGE UP (ref 135–145)
WBC # BLD: 8.54 K/UL — SIGNIFICANT CHANGE UP (ref 3.8–10.5)
WBC # FLD AUTO: 8.54 K/UL — SIGNIFICANT CHANGE UP (ref 3.8–10.5)

## 2025-04-21 PROCEDURE — 99223 1ST HOSP IP/OBS HIGH 75: CPT

## 2025-04-21 PROCEDURE — 71275 CT ANGIOGRAPHY CHEST: CPT | Mod: 26

## 2025-04-21 PROCEDURE — 99285 EMERGENCY DEPT VISIT HI MDM: CPT

## 2025-04-21 PROCEDURE — 99222 1ST HOSP IP/OBS MODERATE 55: CPT

## 2025-04-21 PROCEDURE — 93306 TTE W/DOPPLER COMPLETE: CPT | Mod: 26

## 2025-04-21 PROCEDURE — 99221 1ST HOSP IP/OBS SF/LOW 40: CPT

## 2025-04-21 PROCEDURE — 93010 ELECTROCARDIOGRAM REPORT: CPT

## 2025-04-21 RX ORDER — METOPROLOL SUCCINATE 50 MG/1
50 TABLET, EXTENDED RELEASE ORAL DAILY
Refills: 0 | Status: DISCONTINUED | OUTPATIENT
Start: 2025-04-21 | End: 2025-04-25

## 2025-04-21 RX ORDER — METHYLPREDNISOLONE ACETATE 80 MG/ML
40 INJECTION, SUSPENSION INTRA-ARTICULAR; INTRALESIONAL; INTRAMUSCULAR; SOFT TISSUE DAILY
Refills: 0 | Status: COMPLETED | OUTPATIENT
Start: 2025-04-22 | End: 2025-04-25

## 2025-04-21 RX ORDER — OMEPRAZOLE 20 MG/1
1 CAPSULE, DELAYED RELEASE ORAL
Refills: 0 | DISCHARGE

## 2025-04-21 RX ORDER — LOSARTAN POTASSIUM 100 MG/1
50 TABLET, FILM COATED ORAL DAILY
Refills: 0 | Status: DISCONTINUED | OUTPATIENT
Start: 2025-04-21 | End: 2025-04-25

## 2025-04-21 RX ORDER — MELATONIN 5 MG
3 TABLET ORAL AT BEDTIME
Refills: 0 | Status: DISCONTINUED | OUTPATIENT
Start: 2025-04-21 | End: 2025-04-25

## 2025-04-21 RX ORDER — AZITHROMYCIN 250 MG
CAPSULE ORAL
Refills: 0 | Status: DISCONTINUED | OUTPATIENT
Start: 2025-04-21 | End: 2025-04-25

## 2025-04-21 RX ORDER — FLUOXETINE HYDROCHLORIDE 20 MG/1
0 CAPSULE ORAL
Refills: 0 | DISCHARGE

## 2025-04-21 RX ORDER — METHYLPREDNISOLONE ACETATE 80 MG/ML
40 INJECTION, SUSPENSION INTRA-ARTICULAR; INTRALESIONAL; INTRAMUSCULAR; SOFT TISSUE ONCE
Refills: 0 | Status: COMPLETED | OUTPATIENT
Start: 2025-04-21 | End: 2025-04-21

## 2025-04-21 RX ORDER — AMLODIPINE AND OLMESARTAN MEDOXOMIL 5; 40 MG/1; MG/1
1 TABLET ORAL
Refills: 0 | DISCHARGE

## 2025-04-21 RX ORDER — ALBUTEROL SULFATE 2.5 MG/3ML
2.5 VIAL, NEBULIZER (ML) INHALATION EVERY 4 HOURS
Refills: 0 | Status: DISCONTINUED | OUTPATIENT
Start: 2025-04-21 | End: 2025-04-25

## 2025-04-21 RX ORDER — ALBUTEROL SULFATE 2.5 MG/3ML
0 VIAL, NEBULIZER (ML) INHALATION
Refills: 0 | DISCHARGE

## 2025-04-21 RX ORDER — GABAPENTIN 400 MG/1
0 CAPSULE ORAL
Refills: 0 | DISCHARGE

## 2025-04-21 RX ORDER — FLUTICASONE PROPIONATE 220 UG/1
1 AEROSOL, METERED RESPIRATORY (INHALATION)
Refills: 0 | DISCHARGE

## 2025-04-21 RX ORDER — BUDESONIDE AND FORMOTEROL FUMARATE DIHYDRATE 80; 4.5 UG/1; UG/1
2 AEROSOL RESPIRATORY (INHALATION)
Refills: 0 | DISCHARGE

## 2025-04-21 RX ORDER — FLUOXETINE HYDROCHLORIDE 20 MG/1
1 CAPSULE ORAL
Refills: 0 | DISCHARGE

## 2025-04-21 RX ORDER — TOPIRAMATE 25 MG/1
75 TABLET, FILM COATED ORAL DAILY
Refills: 0 | Status: DISCONTINUED | OUTPATIENT
Start: 2025-04-21 | End: 2025-04-25

## 2025-04-21 RX ORDER — LORATADINE 5 MG/5ML
1 SOLUTION ORAL
Refills: 0 | DISCHARGE

## 2025-04-21 RX ORDER — TOPIRAMATE 25 MG/1
1 TABLET, FILM COATED ORAL
Refills: 0 | DISCHARGE

## 2025-04-21 RX ORDER — IPRATROPIUM BROMIDE AND ALBUTEROL SULFATE .5; 2.5 MG/3ML; MG/3ML
3 SOLUTION RESPIRATORY (INHALATION) ONCE
Refills: 0 | Status: COMPLETED | OUTPATIENT
Start: 2025-04-21 | End: 2025-04-21

## 2025-04-21 RX ORDER — TRAMADOL HYDROCHLORIDE 50 MG/1
0 TABLET, FILM COATED ORAL
Refills: 0 | DISCHARGE

## 2025-04-21 RX ORDER — CEFTRIAXONE 500 MG/1
1000 INJECTION, POWDER, FOR SOLUTION INTRAMUSCULAR; INTRAVENOUS EVERY 24 HOURS
Refills: 0 | Status: DISCONTINUED | OUTPATIENT
Start: 2025-04-21 | End: 2025-04-25

## 2025-04-21 RX ORDER — METOPROLOL SUCCINATE 50 MG/1
1 TABLET, EXTENDED RELEASE ORAL
Refills: 0 | DISCHARGE

## 2025-04-21 RX ORDER — AMLODIPINE BESYLATE 10 MG/1
5 TABLET ORAL DAILY
Refills: 0 | Status: DISCONTINUED | OUTPATIENT
Start: 2025-04-21 | End: 2025-04-25

## 2025-04-21 RX ORDER — LEFLUNOMIDE 10 MG/1
1 TABLET ORAL
Refills: 0 | DISCHARGE

## 2025-04-21 RX ORDER — MONTELUKAST SODIUM 10 MG/1
10 TABLET ORAL DAILY
Refills: 0 | Status: DISCONTINUED | OUTPATIENT
Start: 2025-04-21 | End: 2025-04-25

## 2025-04-21 RX ORDER — ONDANSETRON HCL/PF 4 MG/2 ML
4 VIAL (ML) INJECTION EVERY 8 HOURS
Refills: 0 | Status: DISCONTINUED | OUTPATIENT
Start: 2025-04-21 | End: 2025-04-25

## 2025-04-21 RX ORDER — SUMATRIPTAN 100 MG/1
50 TABLET, FILM COATED ORAL
Refills: 0 | Status: DISCONTINUED | OUTPATIENT
Start: 2025-04-21 | End: 2025-04-25

## 2025-04-21 RX ORDER — ACETAMINOPHEN 500 MG/5ML
650 LIQUID (ML) ORAL EVERY 6 HOURS
Refills: 0 | Status: DISCONTINUED | OUTPATIENT
Start: 2025-04-21 | End: 2025-04-25

## 2025-04-21 RX ORDER — SUMATRIPTAN 100 MG/1
50 TABLET, FILM COATED ORAL DAILY
Refills: 0 | Status: DISCONTINUED | OUTPATIENT
Start: 2025-04-21 | End: 2025-04-21

## 2025-04-21 RX ORDER — GABAPENTIN 400 MG/1
2 CAPSULE ORAL
Refills: 0 | DISCHARGE

## 2025-04-21 RX ORDER — TOPIRAMATE 25 MG/1
0 TABLET, FILM COATED ORAL
Refills: 0 | DISCHARGE

## 2025-04-21 RX ORDER — SUMATRIPTAN 100 MG/1
1 TABLET, FILM COATED ORAL
Refills: 0 | DISCHARGE

## 2025-04-21 RX ORDER — ALBUTEROL SULFATE 2.5 MG/3ML
1 VIAL, NEBULIZER (ML) INHALATION EVERY 4 HOURS
Refills: 0 | Status: DISCONTINUED | OUTPATIENT
Start: 2025-04-21 | End: 2025-04-25

## 2025-04-21 RX ORDER — AZITHROMYCIN 250 MG
500 CAPSULE ORAL ONCE
Refills: 0 | Status: COMPLETED | OUTPATIENT
Start: 2025-04-21 | End: 2025-04-21

## 2025-04-21 RX ORDER — ALBUTEROL SULFATE 2.5 MG/3ML
2.5 VIAL, NEBULIZER (ML) INHALATION EVERY 6 HOURS
Refills: 0 | Status: COMPLETED | OUTPATIENT
Start: 2025-04-21 | End: 2025-04-23

## 2025-04-21 RX ORDER — AZITHROMYCIN 250 MG
500 CAPSULE ORAL EVERY 24 HOURS
Refills: 0 | Status: DISCONTINUED | OUTPATIENT
Start: 2025-04-22 | End: 2025-04-25

## 2025-04-21 RX ORDER — FLUOXETINE HYDROCHLORIDE 20 MG/1
20 CAPSULE ORAL DAILY
Refills: 0 | Status: DISCONTINUED | OUTPATIENT
Start: 2025-04-21 | End: 2025-04-25

## 2025-04-21 RX ORDER — CEFTRIAXONE 500 MG/1
1000 INJECTION, POWDER, FOR SOLUTION INTRAMUSCULAR; INTRAVENOUS EVERY 24 HOURS
Refills: 0 | Status: DISCONTINUED | OUTPATIENT
Start: 2025-04-21 | End: 2025-04-21

## 2025-04-21 RX ORDER — ALBUTEROL SULFATE 2.5 MG/3ML
2 VIAL, NEBULIZER (ML) INHALATION
Refills: 0 | DISCHARGE

## 2025-04-21 RX ORDER — MAGNESIUM, ALUMINUM HYDROXIDE 200-200 MG
30 TABLET,CHEWABLE ORAL EVERY 4 HOURS
Refills: 0 | Status: DISCONTINUED | OUTPATIENT
Start: 2025-04-21 | End: 2025-04-25

## 2025-04-21 RX ORDER — FLUTICASONE PROPIONATE 220 UG/1
0 AEROSOL, METERED RESPIRATORY (INHALATION)
Refills: 0 | DISCHARGE

## 2025-04-21 RX ADMIN — CEFTRIAXONE 1000 MILLIGRAM(S): 500 INJECTION, POWDER, FOR SOLUTION INTRAMUSCULAR; INTRAVENOUS at 17:20

## 2025-04-21 RX ADMIN — Medication 650 MILLIGRAM(S): at 16:06

## 2025-04-21 RX ADMIN — Medication 650 MILLIGRAM(S): at 23:40

## 2025-04-21 RX ADMIN — Medication 250 MILLIGRAM(S): at 16:15

## 2025-04-21 RX ADMIN — Medication 2.5 MILLIGRAM(S): at 16:06

## 2025-04-21 RX ADMIN — AMLODIPINE BESYLATE 5 MILLIGRAM(S): 10 TABLET ORAL at 16:05

## 2025-04-21 RX ADMIN — SUMATRIPTAN 50 MILLIGRAM(S): 100 TABLET, FILM COATED ORAL at 17:20

## 2025-04-21 RX ADMIN — METHYLPREDNISOLONE ACETATE 40 MILLIGRAM(S): 80 INJECTION, SUSPENSION INTRA-ARTICULAR; INTRALESIONAL; INTRAMUSCULAR; SOFT TISSUE at 10:09

## 2025-04-21 RX ADMIN — TOPIRAMATE 75 MILLIGRAM(S): 25 TABLET, FILM COATED ORAL at 16:11

## 2025-04-21 RX ADMIN — IPRATROPIUM BROMIDE AND ALBUTEROL SULFATE 3 MILLILITER(S): .5; 2.5 SOLUTION RESPIRATORY (INHALATION) at 10:08

## 2025-04-21 NOTE — ED ADULT NURSE REASSESSMENT NOTE - NS ED NURSE REASSESS COMMENT FT1
Pt A&Ox4 resting comfortably, shows no s/s of distress RR even and unlabored. Pt updated on plan of care

## 2025-04-21 NOTE — CONSULT NOTE ADULT - SUBJECTIVE AND OBJECTIVE BOX
History of Present Illness:  64 year old female Pmhx of Gerd, RA, Asthma, former smoker, HTN, gastric sleeve, neuro cardiogenic syncopy presenting with cough/ hemoptysis, symptoms present for the past several months.  Patient states she has seen pulmonologist, cardiologist, ENT with various treatments, however, cough has persisted.  Now for the past several days she has noted worsening hemoptysis.  Not currently on any steroids or antibiotics. Patient uses albuterol pump for relief. Pt also c/o of SOB, and ALAN, pt denies chest pain, palpitations, n/v/d.  Thoracic surgery consulted for  2.7 x 2.5 cm right suprahilar mass encasing and obstructing the right upper lobe bronchus.          Past Medical History  Hypertension    Asthma    Rheumatoid Arthritis        Past Surgical History  S/P Breast Biopsy, Left    S/P D&C    S/P Arthroscopic Surgery of Left Knee        MEDICATIONS  (STANDING):  albuterol    0.083% 2.5 milliGRAM(s) Nebulizer every 6 hours  albuterol    90 MICROgram(s) HFA Inhaler 1 Puff(s) Inhalation every 4 hours  amLODIPine   Tablet 5 milliGRAM(s) Oral daily  azithromycin  IVPB 500 milliGRAM(s) IV Intermittent once  azithromycin  IVPB      cefTRIAXone   IVPB 1000 milliGRAM(s) IV Intermittent every 24 hours  cetirizine 10 milliGRAM(s) Oral daily  FLUoxetine 20 milliGRAM(s) Oral daily  fluticasone propionate/ salmeterol 100-50 MICROgram(s) Diskus 1 Dose(s) Inhalation two times a day  losartan 50 milliGRAM(s) Oral daily  metoprolol succinate ER 50 milliGRAM(s) Oral daily  montelukast 10 milliGRAM(s) Oral daily  pantoprazole    Tablet 40 milliGRAM(s) Oral before breakfast  SUMAtriptan 50 milliGRAM(s) Oral daily  topiramate 75 milliGRAM(s) Oral daily    MEDICATIONS  (PRN):  acetaminophen     Tablet .. 650 milliGRAM(s) Oral every 6 hours PRN Temp greater or equal to 38C (100.4F), Mild Pain (1 - 3)  albuterol    0.083% 2.5 milliGRAM(s) Nebulizer every 4 hours PRN Bronchospasm  aluminum hydroxide/magnesium hydroxide/simethicone Suspension 30 milliLiter(s) Oral every 4 hours PRN Dyspepsia  melatonin 3 milliGRAM(s) Oral at bedtime PRN Insomnia  ondansetron Injectable 4 milliGRAM(s) IV Push every 8 hours PRN Nausea and/or Vomiting    Antiplatelet therapy:                           Last dose/amt:    Allergies: No Known Allergies      SOCIAL HISTORY:  Smoker: [ x] Yes  [ ] No        PACK YEARS:         1 PPD for 30 years                 WHEN QUIT? 15 years ago   ETOH use:denies  Ilicit Drug use: denies  Live with: daughter   Assist device use: walker and wheel chair     PMD:  Referring Cardiologist:  Paulina     Relevant Family History  FAMILY HISTORY: Heart disease and colon cancer       Review of Systems  GENERAL:  Fevers[] chills[] sweats[] fatigue[x] weight loss[] weight gain []                                      [ ] NEGATIVE  NEURO:  parathesias[] seizures []  syncope []  confusion []                                                                [x ] NEGATIVE                 EYES: glasses[]  blurry vision[]  discharge[] pain[] glaucoma []                                                           [x ] NEGATIVE                 ENMT:  difficulty hearing []  vertigo[]  dysphagia[] epistaxis[] recent dental work []                     [x ] NEGATIVE                 CV:  chest pain[] palpitations[] ALAN [x] diaphoresis [] edema[]                                                           [ ] NEGATIVE                                 RESPIRATORY:  wheezing[] SOB[] cough x] sputum[x] hemoptysis[x]                                                   [ ] NEGATIVE               GI:  nausea[]  vomiting []  diarrhea[] constipation [] melena []                                                          [x ] NEGATIVE            : hematuria[ ]  dysuria[ ] urgency[] incontinence[]                                                                          [x ] NEGATIVE                    MUSKULOSKELETAL:  arthritis[ ]  joint swelling [ ] muscle weakness [ ]                                            [x ] NEGATIVE                     SKIN/BREAST:  rash[ ] itching [ ]  hair loss[ ] masses[ ]                                                                          [ ] NEGATIVE                     PSYCH:  dementia [ ] depression [x ] anxiety[ ]                                                                                          [ ] NEGATIVE                        HEME/LYMPH:  bruises easily[ ] enlarged lymph nodes[ ] tender lymph nodes[ ]                           [x] NEGATIVE                      ENDOCRINE:  cold intolerance[ ] heat intolerance[ ] polydipsia[ ]                                                      [ x] NEGATIVE                        Telemetry:    PHYSICAL EXAM  Vital Signs Last 24 Hrs  T(C): 36.4 (21 Apr 2025 15:29), Max: 36.5 (21 Apr 2025 09:12)  T(F): 97.5 (21 Apr 2025 15:29), Max: 97.7 (21 Apr 2025 09:12)  HR: 89 (21 Apr 2025 15:29) (89 - 93)  BP: 133/84 (21 Apr 2025 15:29) (133/83 - 133/84)  BP(mean): --  RR: 17 (21 Apr 2025 15:29) (17 - 18)  SpO2: 98% (21 Apr 2025 15:29) (98% - 99%)    Parameters below as of 21 Apr 2025 15:29  Patient On (Oxygen Delivery Method): room air        Neuro: A+O x 3, non-focal, speech clear and intact  HEENT:  NCAT, PERRL, EOMI.  Neck: supple, trachea midline  Pulm: Right Diminished no accessory muscle use noted  CV:  +S1S2, no murmur or rub noted  Abd: soft, NT, ND, + BS  Ext: ARMIJO x 4, no edema, 2+ DP b/l, distal motor/neuro/circ intact.   Skin: warm, dry, well perfused                                                            LABS:                        12.2   8.54  )-----------( 256      ( 21 Apr 2025 10:34 )             39.1     04-21    145  |  110[H]  |  11.6  ----------------------------<  82  3.9   |  21.0[L]  |  0.78    Ca    9.5      21 Apr 2025 10:34    TPro  7.4  /  Alb  3.9  /  TBili  0.4  /  DBili  x   /  AST  19  /  ALT  9   /  AlkPhos  104  04-21    PT/INR - ( 21 Apr 2025 10:34 )   PT: 13.0 sec;   INR: 1.12 ratio         PTT - ( 21 Apr 2025 10:34 )  PTT:34.8 sec  Urinalysis Basic - ( 21 Apr 2025 10:34 )    Color: x / Appearance: x / SG: x / pH: x  Gluc: 82 mg/dL / Ketone: x  / Bili: x / Urobili: x   Blood: x / Protein: x / Nitrite: x   Leuk Esterase: x / RBC: x / WBC x   Sq Epi: x / Non Sq Epi: x / Bacteria: x                < from: CT Angio Chest PE Protocol w/ IV Cont (04.21.25 @ 10:39) >  FINDINGS:    LUNGS AND LARGE AIRWAYS: There is a 2.7 x 2.5 cm right suprahilar mass   encasing and obstructing the right upper lobe bronchus. There is mucous   plugging extending into the right upper lobe airways without significant   volume loss.  PLEURA: No pleural effusion. No pneumothorax or pneumomediastinum.  VESSELS: There is encasement and mild narrowing of the right upper lobe   pulmonary artery and proximal segmental branches. Otherwise, there is no   evidence of filling defect in the first, second, or third order branches   of the pulmonary arteries. The pulmonary trunk and main pulmonary   arteries are unremarkable. The thoracic aorta and great vessels are   unremarkable.  HEART: Heart size is normal. No pericardial effusion.  MEDIASTINUM AND DEE DEE: There are matted hypoenhancing right hilar lymph   nodes. There are shotty pretracheal and subcarinal lymph nodes. There is   no left hilar adenopathy.  CHEST WALL AND LOWER NECK: Within normal limits.  VISUALIZED UPPER ABDOMEN: Sleeve gastrectomy. Cholecystectomy. Moderate   central intrahepatic and common bile duct dilatation, likely related to a   postcholecystectomy reservoir effect.  BONES: Within normal limits.    IMPRESSION: 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 Recommend bronchoscopy for   tissue sampling. Results discussed with Dr. Crum at time of   interpretation.    --- End of Report ---      < end of copied text >

## 2025-04-21 NOTE — ED PROVIDER NOTE - CLINICAL SUMMARY MEDICAL DECISION MAKING FREE TEXT BOX
Patient presenting with acute on chronic cough now with hemoptysis.  Mild wheezing on examination but no distress.  Will treat with steroids, prednisone, CTA chest as patient has not had advanced imaging of lungs throughout chronic cough symptoms.

## 2025-04-21 NOTE — H&P ADULT - NSHPLABSRESULTS_GEN_ALL_CORE
12.2   8.54  )-----------( 256      ( 21 Apr 2025 10:34 )             39.1     04-21    145  |  110[H]  |  11.6  ----------------------------<  82  3.9   |  21.0[L]  |  0.78    Ca    9.5      21 Apr 2025 10:34    TPro  7.4  /  Alb  3.9  /  TBili  0.4  /  DBili  x   /  AST  19  /  ALT  9   /  AlkPhos  104  04-21    < from: CT Angio Chest PE Protocol w/ IV Cont (04.21.25 @ 10:39) >    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 Recommend bronchoscopy for   tissue sampling. Results discussed with Dr. Crum at time of   interpretation.    < end of copied text >

## 2025-04-21 NOTE — ED ADULT TRIAGE NOTE - CHIEF COMPLAINT QUOTE
pt brought by rescue for "coughing" since August, Tuesday had some blood, HX asthma, HTN  A&Ox3, resp wnl, + cough noted

## 2025-04-21 NOTE — CONSULT NOTE ADULT - PROBLEM SELECTOR RECOMMENDATION 9
Cardiac Risk Stratification for Procedure  - Pt planned for Bronch/EBUS procedure  - ECG: SR @ 94bpm, poss anterior infarct, age undetermined  - Troponin ; pBNP   - If abnormal, obtain TTE for evaluation of cardiac function, WMA, and any valvular abnormalities   - METS >4  - RCRI Risk Stratification: Class   Risk,    Risk of Major Cardiac Event  - No active chest pain, evidence of ischemia, decompensated CHF, significant valvular abnormality, or unstable arrhythmia then therefore no absolute cardiac contraindication to the planned procedure.  - Benefits of surgery outweigh the risk. No further cardiac work up is needed.   - Further cardiac work up will not change risk of the patient. Proceed for procedure as indicated.  - Patient's risk profile is generated using RCRI which is applicable for any procedure/surgery except cardiac surgery.     Assessment and recommendations are final when note is signed by the attending. Cardiac Risk Stratification for Procedure  - Pt planned for Bronch/EBUS procedure  - ECG: SR @ 94bpm, poss anterior infarct, age undetermined  - OP cardiac testing performed 2/25/2025       TTE: LVEF 63%, normal LV diastolic function, normal RA & RV, no significant valvular dz, no pericardial effusion        NST: Normal myocardial perfusion scan, no evidence of infarction or inducible ischemia. Post stress LVEF 72%   - METS >4  - RCRI Risk Stratification: Class I Risk, 6.0% Risk of Major Cardiac Event  - No active chest pain, evidence of ischemia, decompensated CHF, significant valvular abnormality, or unstable arrhythmia then therefore no absolute cardiac contraindication to the planned procedure.  - Benefits of surgery outweigh the risk. No further cardiac work up is needed.   - Further cardiac work up will not change risk of the patient. Proceed for procedure as indicated.  - Patient's risk profile is generated using RCRI which is applicable for any procedure/surgery except cardiac surgery.     Assessment and recommendations are final when note is signed by the attending. Cardiac Risk Stratification for Procedure  - Pt planned for Bronch/EBUS procedure  - ECG: SR @ 94bpm, poss anterior infarct, age undetermined  - OP cardiac testing performed 2/25/2025     TTE: LVEF 63%, normal LV diastolic function, normal RA & RV, no significant valvular dz, no pericardial effusion      NST: Normal myocardial perfusion scan, no evidence of infarction or inducible ischemia. Post stress LVEF 72%   - METS >4  - RCRI Risk Stratification: Class I Risk, 6.0% Risk of Major Cardiac Event  - No active chest pain, evidence of ischemia, decompensated CHF, significant valvular abnormality, or unstable arrhythmia then therefore no absolute cardiac contraindication to the planned procedure.  - Benefits of surgery outweigh the risk. No further cardiac work up is needed.   - Further cardiac work up will not change risk of the patient. Proceed for procedure as indicated.  - Patient's risk profile is generated using RCRI which is applicable for any procedure/surgery except cardiac surgery.     Assessment and recommendations are final when note is signed by the attending.

## 2025-04-21 NOTE — CONSULT NOTE ADULT - ASSESSMENT
64 year old female Pmhx of Gerd, RA, Asthma, former smoker, HTN, gastric sleeve, neuro cardiogenic syncopy presenting with cough/ hemoptysis, symptoms present for the past several months.  Patient states she has seen pulmonologist, cardiologist, ENT with various treatments, however, cough has persisted.  Now for the past several days she has noted worsening hemoptysis.  Not currently on any steroids or antibiotics. Patient uses albuterol pump for relief. Thoracic surgery consulted for  2.7 x 2.5 cm right suprahilar mass   encasing and obstructing the right upper lobe bronchus.

## 2025-04-21 NOTE — CONSULT NOTE ADULT - PROBLEM SELECTOR RECOMMENDATION 9
Pt noted to have a suprahilar mass on CT scan.   Pt booked 4/22/25 for Bronch / EBUS with Brenna in Endoscopy   NPO after MN   Please obtain cardiac and medical clearance   obtain current Type and screen   rest of care as per primary team     Plan d/w Dr. Ceja

## 2025-04-21 NOTE — CONSULT NOTE ADULT - ASSESSMENT
65 y/o F with hx of asthma on Breyna, RA on leflunamide, GERD on omeprazole, HTN, gastric sleeve, HTN, present for worsening cough with hemoptysis.  Report chronic cough since Aug 2024, and hemoptysis started 4/15. .  Patient also report weight loss of 29 lb since Nov 2024.  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    Currently minimal hemoptysis, afebrile, on room air.     Agree with EBUS and BAL  agree on empiric ceft azithro , f/u up cultures  continue duoneb  okay with empiric short course steroid  tentative follow up appointment made with Dr. Jones on 5/1  at 3;45 pm at [facility] office to follow up result.     Pulm will sign off, please call back for any question

## 2025-04-21 NOTE — H&P ADULT - GASTROINTESTINAL
Addended by: NAT SCHNEIDER on: 5/3/2024 02:44 PM     Modules accepted: Orders     normal/soft/nontender/nondistended/normal active bowel sounds

## 2025-04-21 NOTE — CONSULT NOTE ADULT - SUBJECTIVE AND OBJECTIVE BOX
[facility]                                              CARDIOLOGY AT [address], [place] [address]                                             Telephone: [phone]. Fax:[phone]                                                       CARDIOLOGY CONSULTATION NOTE                                                                 INCOMPLETE                            History obtained by: Patient and medical record  Community Cardiologist: Dr. Cox   obtained: Yes [  ] No [ x ]  Reason for Consultation: Risk Stratification   Available out pt records reviewed: Yes [ x ] No [  ]    Chief complaint:    Patient is a 64y old  Female who presents with a chief complaint of Hemoptysis (21 Apr 2025 17:04)      HPI:  64 y.o. female with PMHx of HTN, GERD, gastric sleeve, RA, Asthma, former smoker, neurocardiogenic syncopy presents with SOB & hemoptysis. Pt reports having a persistent cough for several months and being evaluated OP by cardiologist, pulmonologist, & ENT, however, cough persisted. Pt now developed hemoptysis for several days and notes it is worsening. CTA Chest revealed RUL suprahilar mass w/ encasement and obstruction of RUL bronchus. Thoracic Sx consulted and plan for Bronch/EBUS procedure in AM with Dr. Ceja. Cardiology consulted for risk stratification. Pt seen by Dr. Cox OP and found to have normal echo and NST. Denies chest pain, back pain, headache, dizziness, diaphoresis, syncope or N/V.      CARDIAC TESTING   ECHO: Performed OP 2/25/2025  CONCLUSIONS:    1. Left atrium is normal in size.  2. Left ventricular systolic function is normal with an ejection fraction of 63 % by Cowan's method of disks.  3. Normal left ventricular diastolic function.  4. The right atrium is normal in size.  5. Normal right ventricular cavity size and normal right ventricular systolic function.  6. No significant valvular disease.  7. No pericardial effusion seen.    STRESS: Performed OP 2/25/2025  1. NORMAL STUDY  2. Inability to exercise due to decrease exercise capacity.  3. The patient underwent stress testing using pharmacological IV dobutamine protocol. 96% MPHR.  4. No cardiac symptoms. No ischemic ECG changes.s  5. Normal myocardial perfusion scan, with no evidence of infarction or inducible ischemia.  6. The left ventricle is normal in function. The post stress left ventricular EF is 72 %.    CATH:     ELECTROPHYSIOLOGY:     PAST MEDICAL HISTORY  Hypertension  Asthma  Rheumatoid Arthritis    PAST SURGICAL HISTORY  S/P Breast Biopsy, Left  S/P D&C  S/P Arthroscopic Surgery of Left Knee    SOCIAL HISTORY:    CIGARETTES:   Former smoker, quit 15 years ago; prior 1 PPD x 30 years   ALCOHOL:     Denies  DRUGS:     Denies    FAMILY HISTORY:  Mother MI  Brother MI  Sister MI    Family History of Cardiovascular Disease:  Yes [ x ] No [  ]  Coronary Artery Disease in first degree relative: Yes [ x ] No [  ]  Sudden Cardiac Death in First degree relative: Yes [  ] No [  ]    HOME MEDICATIONS:  Allegra 180 mg oral tablet: 1 tab(s) orally once a day (21 Apr 2025 15:45)  amlodipine-olmesartan 5 mg-20 mg oral tablet: 1 tab(s) orally once a day (21 Apr 2025 15:45)  Breyna 160 mcg-4.5 mcg/inh inhalation aerosol: 2 inhaled 2 times a day (21 Apr 2025 15:45)  FLUoxetine 20 mg oral capsule: 1 cap(s) orally once a day (21 Apr 2025 15:45)  fluticasone 50 mcg/inh inhalation powder: 1 inhaled 2 times a day (21 Apr 2025 15:45)  gabapentin 100 mg oral tablet: 2 tab(s) orally once a day (21 Apr 2025 15:45)  leflunomide 20 mg oral tablet: 1 tab(s) orally once a day (21 Apr 2025 15:45)  metoprolol succinate 50 mg oral tablet, extended release: 1 tab(s) orally 2 times a day (21 Apr 2025 15:45)  omeprazole 40 mg oral delayed release capsule: orally (21 Apr 2025 15:36)  ondansetron 8 mg oral tablet, disintegrating: orally (21 Apr 2025 15:36)  Singulair 10 mg oral tablet: 1 tab(s) orally (21 Apr 2025 15:47)  SUMAtriptan 50 mg oral tablet: 1 tab(s) orally once a day (21 Apr 2025 15:36)  topiramate 25 mg oral tablet: 1 tab(s) orally 3 times a day (21 Apr 2025 15:45)  traMADol 50 mg oral tablet: 1 tab(s) orally 3 times a day as needed for  pain (21 Apr 2025 15:45)  Ventolin HFA 90 mcg/inh inhalation aerosol: 2 inhaled every 4 hours as needed for  shortness of breath and/or wheezing (21 Apr 2025 15:45)      CURRENT CARDIAC MEDICATIONS:  amLODIPine   Tablet 5 milliGRAM(s) Oral daily  losartan 50 milliGRAM(s) Oral daily  metoprolol succinate ER 50 milliGRAM(s) Oral daily      CURRENT OTHER MEDICATIONS:  albuterol    0.083% 2.5 milliGRAM(s) Nebulizer every 6 hours, Stop order after: 8 Doses  albuterol    0.083% 2.5 milliGRAM(s) Nebulizer every 4 hours PRN Bronchospasm  albuterol    90 MICROgram(s) HFA Inhaler 1 Puff(s) Inhalation every 4 hours  cetirizine 10 milliGRAM(s) Oral daily  fluticasone propionate/ salmeterol 100-50 MICROgram(s) Diskus 1 Dose(s) Inhalation two times a day  montelukast 10 milliGRAM(s) Oral daily  acetaminophen     Tablet .. 650 milliGRAM(s) Oral every 6 hours PRN Temp greater or equal to 38C (100.4F), Mild Pain (1 - 3)  FLUoxetine 20 milliGRAM(s) Oral daily  melatonin 3 milliGRAM(s) Oral at bedtime PRN Insomnia  ondansetron Injectable 4 milliGRAM(s) IV Push every 8 hours PRN Nausea and/or Vomiting  SUMAtriptan 50 milliGRAM(s) Oral two times a day, Stop order after: 9 Doses PRN Migraine  topiramate 75 milliGRAM(s) Oral daily  aluminum hydroxide/magnesium hydroxide/simethicone Suspension 30 milliLiter(s) Oral every 4 hours PRN Dyspepsia  pantoprazole    Tablet 40 milliGRAM(s) Oral before breakfast  azithromycin  IVPB      cefTRIAXone Injectable. 1000 milliGRAM(s) IV Push every 24 hours, Stop order after: 5 Days      ALLERGIES:   No Known Allergies      REVIEW OF SYMPTOMS:   CONSTITUTIONAL: No fever, no chills, no weight loss, no weight gain, no fatigue   ENMT:  No vertigo; No sinus or throat pain  NECK: No pain or stiffness  CARDIOVASCULAR: No chest pain, no dyspnea, no syncope/presyncope, no palpitations, no dizziness, no orthopnea, no paroxsymal nocturnal dyspnea  RESPIRATORY: +Shortness of breath +Hemoptysis. No wheezing  : No dysuria, no hematuria   GI: No dark color stool, no nausea, no diarrhea, no constipation, no abdominal pain   NEURO: No headache, no slurred speech   MUSCULOSKELETAL: No joint pain or swelling; No muscle, back, or extremity pain  PSYCH: No agitation, no anxiety  ALL OTHER REVIEW OF SYSTEMS ARE NEGATIVE.    VITAL SIGNS:  T(C): 36.9 (04-21-25 @ 19:30), Max: 36.9 (04-21-25 @ 19:30)  T(F): 98.5 (04-21-25 @ 19:30), Max: 98.5 (04-21-25 @ 19:30)  HR: 81 (04-21-25 @ 19:30) (81 - 106)  BP: 132/82 (04-21-25 @ 19:30) (132/82 - 143/86)  RR: 18 (04-21-25 @ 19:30) (17 - 18)  SpO2: 95% (04-21-25 @ 19:30) (95% - 99%)    INTAKE AND OUTPUT:       PHYSICAL EXAM:  Constitutional: Comfortable. No acute distress.   HEENT: Atraumatic and normocephalic, neck is supple. No JVD. No carotid bruit.  CNS: A&Ox3. No focal deficits.   Respiratory: **** CTAB, unlabored   Cardiovascular: RRR normal S1 S2. No murmur. No rubs or gallop.  Gastrointestinal: Soft, non-tender. +Bowel sounds.   Extremities: 2+ Peripheral Pulses, No clubbing, cyanosis, or edema  Psychiatric: Calm. No agitation.   Skin: Warm and dry, no ulcers on extremities     LABS:             12.2   8.54  )-----------( 256      ( 21 Apr 2025 10:34 )             39.1     04-21    145  |  110[H]  |  11.6  ----------------------------<  82  3.9   |  21.0[L]  |  0.78    Ca    9.5      21 Apr 2025 10:34    TPro  7.4  /  Alb  3.9  /  TBili  0.4  /  DBili  x   /  AST  19  /  ALT  9   /  AlkPhos  104  04-21    PT/INR - ( 21 Apr 2025 10:34 )   PT: 13.0 sec;   INR: 1.12 ratio         PTT - ( 21 Apr 2025 10:34 )  PTT:34.8 sec  Urinalysis Basic - ( 21 Apr 2025 10:34 )    Color: x / Appearance: x / SG: x / pH: x  Gluc: 82 mg/dL / Ketone: x  / Bili: x / Urobili: x   Blood: x / Protein: x / Nitrite: x   Leuk Esterase: x / RBC: x / WBC x   Sq Epi: x / Non Sq Epi: x / Bacteria: x      INTERPRETATION OF TELEMETRY:     ECG: SR @ 94bpm, poss anterior infarct, age undetermined  Prior ECG: Yes [ x ] No [  ]    RADIOLOGY & ADDITIONAL STUDIES:   X-ray:    CT scan:   < from: CT Angio Chest PE Protocol w/ IV Cont (04.21.25 @ 10:39) >  IMPRESSION: 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 Recommend bronchoscopy for   tissue sampling. Results discussed with Dr. Crum at time of   interpretation.    --- End of Report ---    < end of copied text >    MRI:   US:                                              [facility]                                              CARDIOLOGY AT [address], [place] [address]                                             Telephone: [phone]. Fax:[phone]                                                       CARDIOLOGY CONSULTATION NOTE                                                                   History obtained by: Patient and medical record  Community Cardiologist: Dr. Cox   obtained: Yes [  ] No [ x ]  Reason for Consultation: Risk Stratification   Available out pt records reviewed: Yes [ x ] No [  ]    Chief complaint:    Patient is a 64y old  Female who presents with a chief complaint of Hemoptysis (21 Apr 2025 17:04)      HPI:  64 y.o. female with PMHx of HTN, GERD, gastric sleeve, RA, Asthma, former smoker, neurocardiogenic syncopy presents with SOB & hemoptysis. Pt reports having a persistent cough for several months and being evaluated OP by cardiologist, pulmonologist, & ENT, however, cough persisted. Pt now developed hemoptysis for several days and notes it is worsening. CTA Chest revealed RUL suprahilar mass w/ encasement and obstruction of RUL bronchus. Thoracic Sx consulted and plan for Bronch/EBUS procedure in AM with Dr. Ceja. Cardiology consulted for risk stratification. Pt seen by Dr. Cox OP and found to have normal echo and NST. Denies chest pain, back pain, headache, dizziness, diaphoresis, syncope or N/V.      CARDIAC TESTING   ECHO: Performed OP 2/25/2025  CONCLUSIONS:    1. Left atrium is normal in size.  2. Left ventricular systolic function is normal with an ejection fraction of 63 % by Cowan's method of disks.  3. Normal left ventricular diastolic function.  4. The right atrium is normal in size.  5. Normal right ventricular cavity size and normal right ventricular systolic function.  6. No significant valvular disease.  7. No pericardial effusion seen.    STRESS: Performed OP 2/25/2025  1. NORMAL STUDY  2. Inability to exercise due to decrease exercise capacity.  3. The patient underwent stress testing using pharmacological IV dobutamine protocol. 96% MPHR.  4. No cardiac symptoms. No ischemic ECG changes.s  5. Normal myocardial perfusion scan, with no evidence of infarction or inducible ischemia.  6. The left ventricle is normal in function. The post stress left ventricular EF is 72 %.    CATH:     ELECTROPHYSIOLOGY:     PAST MEDICAL HISTORY  Hypertension  Asthma  Rheumatoid Arthritis    PAST SURGICAL HISTORY  S/P Breast Biopsy, Left  S/P D&C  S/P Arthroscopic Surgery of Left Knee    SOCIAL HISTORY:    CIGARETTES:   Former smoker, quit 15 years ago; prior 1 PPD x 30 years   ALCOHOL:     Denies  DRUGS:     Denies    FAMILY HISTORY:  Mother MI  Brother MI  Sister MI    Family History of Cardiovascular Disease:  Yes [ x ] No [  ]  Coronary Artery Disease in first degree relative: Yes [ x ] No [  ]  Sudden Cardiac Death in First degree relative: Yes [  ] No [  ]    HOME MEDICATIONS:  Allegra 180 mg oral tablet: 1 tab(s) orally once a day (21 Apr 2025 15:45)  amlodipine-olmesartan 5 mg-20 mg oral tablet: 1 tab(s) orally once a day (21 Apr 2025 15:45)  Breyna 160 mcg-4.5 mcg/inh inhalation aerosol: 2 inhaled 2 times a day (21 Apr 2025 15:45)  FLUoxetine 20 mg oral capsule: 1 cap(s) orally once a day (21 Apr 2025 15:45)  fluticasone 50 mcg/inh inhalation powder: 1 inhaled 2 times a day (21 Apr 2025 15:45)  gabapentin 100 mg oral tablet: 2 tab(s) orally once a day (21 Apr 2025 15:45)  leflunomide 20 mg oral tablet: 1 tab(s) orally once a day (21 Apr 2025 15:45)  metoprolol succinate 50 mg oral tablet, extended release: 1 tab(s) orally 2 times a day (21 Apr 2025 15:45)  omeprazole 40 mg oral delayed release capsule: orally (21 Apr 2025 15:36)  ondansetron 8 mg oral tablet, disintegrating: orally (21 Apr 2025 15:36)  Singulair 10 mg oral tablet: 1 tab(s) orally (21 Apr 2025 15:47)  SUMAtriptan 50 mg oral tablet: 1 tab(s) orally once a day (21 Apr 2025 15:36)  topiramate 25 mg oral tablet: 1 tab(s) orally 3 times a day (21 Apr 2025 15:45)  traMADol 50 mg oral tablet: 1 tab(s) orally 3 times a day as needed for  pain (21 Apr 2025 15:45)  Ventolin HFA 90 mcg/inh inhalation aerosol: 2 inhaled every 4 hours as needed for  shortness of breath and/or wheezing (21 Apr 2025 15:45)      CURRENT CARDIAC MEDICATIONS:  amLODIPine   Tablet 5 milliGRAM(s) Oral daily  losartan 50 milliGRAM(s) Oral daily  metoprolol succinate ER 50 milliGRAM(s) Oral daily      CURRENT OTHER MEDICATIONS:  albuterol    0.083% 2.5 milliGRAM(s) Nebulizer every 6 hours, Stop order after: 8 Doses  albuterol    0.083% 2.5 milliGRAM(s) Nebulizer every 4 hours PRN Bronchospasm  albuterol    90 MICROgram(s) HFA Inhaler 1 Puff(s) Inhalation every 4 hours  cetirizine 10 milliGRAM(s) Oral daily  fluticasone propionate/ salmeterol 100-50 MICROgram(s) Diskus 1 Dose(s) Inhalation two times a day  montelukast 10 milliGRAM(s) Oral daily  acetaminophen     Tablet .. 650 milliGRAM(s) Oral every 6 hours PRN Temp greater or equal to 38C (100.4F), Mild Pain (1 - 3)  FLUoxetine 20 milliGRAM(s) Oral daily  melatonin 3 milliGRAM(s) Oral at bedtime PRN Insomnia  ondansetron Injectable 4 milliGRAM(s) IV Push every 8 hours PRN Nausea and/or Vomiting  SUMAtriptan 50 milliGRAM(s) Oral two times a day, Stop order after: 9 Doses PRN Migraine  topiramate 75 milliGRAM(s) Oral daily  aluminum hydroxide/magnesium hydroxide/simethicone Suspension 30 milliLiter(s) Oral every 4 hours PRN Dyspepsia  pantoprazole    Tablet 40 milliGRAM(s) Oral before breakfast  azithromycin  IVPB      cefTRIAXone Injectable. 1000 milliGRAM(s) IV Push every 24 hours, Stop order after: 5 Days      ALLERGIES:   No Known Allergies      REVIEW OF SYMPTOMS:   CONSTITUTIONAL: No fever, no chills, no weight loss, no weight gain, no fatigue   ENMT:  No vertigo; No sinus or throat pain  NECK: No pain or stiffness  CARDIOVASCULAR: No chest pain, no dyspnea, no syncope/presyncope, no palpitations, no dizziness, no orthopnea, no paroxsymal nocturnal dyspnea  RESPIRATORY: +Shortness of breath +Hemoptysis. No wheezing  : No dysuria, no hematuria   GI: No dark color stool, no nausea, no diarrhea, no constipation, no abdominal pain   NEURO: No headache, no slurred speech   MUSCULOSKELETAL: No joint pain or swelling; No muscle, back, or extremity pain  PSYCH: No agitation, no anxiety  ALL OTHER REVIEW OF SYSTEMS ARE NEGATIVE.    VITAL SIGNS:  T(C): 36.9 (04-21-25 @ 19:30), Max: 36.9 (04-21-25 @ 19:30)  T(F): 98.5 (04-21-25 @ 19:30), Max: 98.5 (04-21-25 @ 19:30)  HR: 81 (04-21-25 @ 19:30) (81 - 106)  BP: 132/82 (04-21-25 @ 19:30) (132/82 - 143/86)  RR: 18 (04-21-25 @ 19:30) (17 - 18)  SpO2: 95% (04-21-25 @ 19:30) (95% - 99%)    INTAKE AND OUTPUT:       PHYSICAL EXAM:  Constitutional: Comfortable. No acute distress.   HEENT: Atraumatic and normocephalic, neck is supple. No JVD. No carotid bruit.  CNS: A&Ox3. No focal deficits.   Respiratory: CTAB, unlabored   Cardiovascular: RRR normal S1 S2. No murmur. No rubs or gallop.  Gastrointestinal: Soft, non-tender. +Bowel sounds.   Extremities: 2+ Peripheral Pulses, No clubbing, cyanosis, or edema  Psychiatric: Calm. No agitation.   Skin: Warm and dry, no ulcers on extremities     LABS:             12.2   8.54  )-----------( 256      ( 21 Apr 2025 10:34 )             39.1     04-21    145  |  110[H]  |  11.6  ----------------------------<  82  3.9   |  21.0[L]  |  0.78    Ca    9.5      21 Apr 2025 10:34    TPro  7.4  /  Alb  3.9  /  TBili  0.4  /  DBili  x   /  AST  19  /  ALT  9   /  AlkPhos  104  04-21    PT/INR - ( 21 Apr 2025 10:34 )   PT: 13.0 sec;   INR: 1.12 ratio         PTT - ( 21 Apr 2025 10:34 )  PTT:34.8 sec  Urinalysis Basic - ( 21 Apr 2025 10:34 )    Color: x / Appearance: x / SG: x / pH: x  Gluc: 82 mg/dL / Ketone: x  / Bili: x / Urobili: x   Blood: x / Protein: x / Nitrite: x   Leuk Esterase: x / RBC: x / WBC x   Sq Epi: x / Non Sq Epi: x / Bacteria: x      INTERPRETATION OF TELEMETRY:     ECG: SR @ 94bpm, poss anterior infarct, age undetermined  Prior ECG: Yes [ x ] No [  ]    RADIOLOGY & ADDITIONAL STUDIES:   X-ray:    CT scan:   < from: CT Angio Chest PE Protocol w/ IV Cont (04.21.25 @ 10:39) >  IMPRESSION: 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 Recommend bronchoscopy for   tissue sampling. Results discussed with Dr. Crum at time of   interpretation.    --- End of Report ---    < end of copied text >    MRI:   US:

## 2025-04-21 NOTE — PATIENT PROFILE ADULT - NSPROPOAURINARYCATHETER_GEN_A_NUR
[facility] Department    [address]     [place] [address]    Phone:  [phone]    Fax:  [phone]           Ronny Lorenzo   MRN: [id number]    Department:  [facility] Emergency Department   Date of Visit:  6/22/ and Class Registration line at [phone] or find a doctor online by visiting [email or web address].    IF THERE IS ANY CHANGE OR WORSENING OF YOUR CONDITION, CALL YOUR PRIMARY CARE PHYSICIAN AT ONCE OR RETURN IMMEDIATELY TO [address] [place] [address].     If no

## 2025-04-21 NOTE — CONSULT NOTE ADULT - ASSESSMENT
64 y.o. female with PMHx of HTN, GERD, gastric sleeve, RA, Asthma, former smoker, neurocardiogenic syncopy presents with SOB & hemoptysis. Pt reports having a persistent cough for several months and being evaluated OP by cardiologist, pulmonologist, & ENT, however, cough persisted. Pt now developed hemoptysis for several days and notes it is worsening. CTA Chest revealed RUL suprahilar mass w/ encasement and obstruction of RUL bronchus. Thoracic Sx consulted and plan for Bronch/EBUS procedure in AM with Dr. Ceja. Cardiology consulted for risk stratification. Pt seen by Dr. Cox OP and found to have normal echo and NST. Denies chest pain, back pain, headache, dizziness, diaphoresis, syncope or N/V.

## 2025-04-21 NOTE — H&P ADULT - HISTORY OF PRESENT ILLNESS
64 year old female presenting with cough.  symptoms present for the past several months.  Patient states she has seen pulmonologist, cardiologist, ENT with various treatments.  Cough has persisted.  Now for the past several days she has noted worsening hemoptysis.  Not currently on any steroids or antibiotics.  Uses albuterol pump for relief.  Denies any abdominal pain, nausea, vomiting remarkably stable.  No chest pain.  No lower extremity pain or swelling.  No fevers or chills.  No sore throat or nasal congestion.    In ED, Initial vitals were stable. CT angio chest reported RUL mass pressing bronchus. CT surgery and pulmonology consulted. Patient was admitted to hospitalist for lung mass management.

## 2025-04-21 NOTE — PATIENT PROFILE ADULT - FALL HARM RISK - RISK INTERVENTIONS

## 2025-04-21 NOTE — ED ADULT NURSE NOTE - OBJECTIVE STATEMENT
Assumed care of pt at 0957 in . Pt A&Ox4 c/o cough since august. Pt states she has had a cough since august seen pulmonologist, cardiologist and primary care  Pt states last week she started to cough blood. Pt shows no s/s of distress RR even and unlabored

## 2025-04-21 NOTE — H&P ADULT - ASSESSMENT
64 year old female with PMH of HTN, HLD, asthma, breast cyst s/p resection, allergy and RA on leflunomide presenting with worsening cough and hemoptysis. She was admitted to hospitalist for lung mass management.    # Right Lung mass  #Asthma with acute exacerbation  -Presented with acute on chronic worsening cough with hemoptysis and CT angio showed right lung mass pressing bronchus  -CT surgery and pulmonology consulted  -Plan for bronchoscopy tomorrow  -Continue IV solumedrol  -Albuterol PRN  -Ceftriaxone and Azithromycin to cover superimposed pneumonia  - strep, legionella and sputum culture    # HTN  -Amlodipine and losartan  -Continue metoprolol    # RA  -Hold leflunomide    # Allergy  -continue allegra    DVT Ppx- hold chemical PPX in prepration of  biopsy tomorrow    Dispo- Possible discharge in 1-2 days after lung biopsy and improvement in symptoms   64 year old female with PMH of HTN, HLD, asthma, breast cyst s/p resection, allergy and RA on leflunomide presenting with worsening cough and hemoptysis. She was admitted to hospitalist for lung mass management.    # Right Lung mass  #Asthma with acute exacerbation  -Presented with acute on chronic worsening cough with hemoptysis and CT angio showed right lung mass pressing bronchus  -CT surgery and pulmonology consulted  -Plan for bronchoscopy tomorrow  -Continue IV solumedrol  -Albuterol PRN  -Ceftriaxone and Azithromycin to cover superimposed pneumonia  - strep, legionella and sputum culture    # Pre-op evaluation  -H/o HTN and RA is undergoing bronchscopy and EBUS for right hilar mass  -TTE unremarkable, No sign of ischemia or CHF.   -RCRI 1 and exercise capacity more than 4 Mets.   -Cardiology recommended no intervention  -She is medically optimized to undergo bronchoscopy without any further cardiopulmonary intervention.    # HTN  -Amlodipine and losartan  -Continue metoprolol    # RA  -Hold leflunomide    # Allergy  -continue allegra    DVT Ppx- hold chemical PPX in prepration of  biopsy tomorrow    Dispo- Possible discharge in 1-2 days after lung biopsy and improvement in symptoms

## 2025-04-21 NOTE — CONSULT NOTE ADULT - SUBJECTIVE AND OBJECTIVE BOX
History     Chief Complaint:  Arm Pain    HPI   Leann Fraga is a 11 year old female who presents to the emergency department today with her father for evaluation of right elbow and left knee pain. The patient reports that she was roller-blading at her birthday party at 2045 this evening when she tripped on another person's roller-blade and fell forward onto her right elbow and left knee. Here in the emergency department the patient endorses right elbow pain and left knee pain. She denies any other pain. She did not hit her head and did not lose consciousness.     Allergies:  No Known Drug Allergies    Medications:    Medications reviewed. No current medications.     Past Medical History:    None    Past Surgical History:    Surgical history reviewed. No pertinent surgical history.    Family History:    Family history reviewed. No pertinent family history.     Social History:  The patient was accompanied to the ED by her father.  Smoking Status: Never Smoker  Smokeless Tobacco: Never Used  Alcohol Use: Negative     Review of Systems   Musculoskeletal:        Right elbow and left knee pain   Neurological:        No loss of consciousness    All other systems reviewed and are negative.    Physical Exam     Patient Vitals for the past 24 hrs:   BP Temp Temp src Pulse Resp SpO2 Weight   02/18/18 0015 - - - 98 18 99 % -   02/17/18 2216 136/74 98.9  F (37.2  C) Oral 117 18 98 % 57.4 kg (126 lb 9.6 oz)     Physical Exam  Nursing note and vitals reviewed.  Constitutional:  Alert, cooperative     Appears well-developed and well-nourished.   HENT:   Head:      Mouth/Throat:   Oropharynx is clear and moist. No oropharyngeal exudate.   Eyes:    EOM are normal. Pupils are equal, round, and reactive to light.   Neck:    Normal range of motion. Neck supple.      No tracheal deviation present. No thyromegaly present.   Cardiovascular:  Normal rate, regular rhythm, normal heart sounds and      intact distal pulses.  Exam reveals  Patient is a 64y old  Female who presents with a chief complaint of Hemoptysis (21 Apr 2025 16:01)      BRIEF HOSPITAL COURSE:   65 y/o F with hx of asthma on Breyna, RA on leflunamide, GERD on omeprazole, HTN, gastric sleeve, HTN, present for worsening cough with hemoptysis.  Report chronic cough since Aug 2024, and hemoptysis started 4/15. .  Patient also report weight loss of 29 lb since Nov 2024.  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      Patient denies acute complaint, last saw Dr. Patel on 4/7  Currently on room air, denies acute complaint.         PAST MEDICAL & SURGICAL HISTORY:  Hypertension      Asthma      Rheumatoid Arthritis      S/P Breast Biopsy, Left      S/P D&C      S/P Arthroscopic Surgery of Left Knee        Allergies    No Known Allergies    Intolerances      FAMILY HISTORY:      Family history otherwise noncontributory.    Social History: ***    Review of Systems:    ALL OTHER REVIEW OF SYSTEMS EXCEPT PER HPI NEGATIVE.      Medications:  azithromycin  IVPB      cefTRIAXone Injectable. 1000 milliGRAM(s) IV Push every 24 hours    amLODIPine   Tablet 5 milliGRAM(s) Oral daily  losartan 50 milliGRAM(s) Oral daily  metoprolol succinate ER 50 milliGRAM(s) Oral daily    albuterol    0.083% 2.5 milliGRAM(s) Nebulizer every 6 hours  albuterol    0.083% 2.5 milliGRAM(s) Nebulizer every 4 hours PRN  albuterol    90 MICROgram(s) HFA Inhaler 1 Puff(s) Inhalation every 4 hours  cetirizine 10 milliGRAM(s) Oral daily  fluticasone propionate/ salmeterol 100-50 MICROgram(s) Diskus 1 Dose(s) Inhalation two times a day  montelukast 10 milliGRAM(s) Oral daily    acetaminophen     Tablet .. 650 milliGRAM(s) Oral every 6 hours PRN  FLUoxetine 20 milliGRAM(s) Oral daily  melatonin 3 milliGRAM(s) Oral at bedtime PRN  ondansetron Injectable 4 milliGRAM(s) IV Push every 8 hours PRN  SUMAtriptan 50 milliGRAM(s) Oral two times a day PRN  topiramate 75 milliGRAM(s) Oral daily        aluminum hydroxide/magnesium hydroxide/simethicone Suspension 30 milliLiter(s) Oral every 4 hours PRN  pantoprazole    Tablet 40 milliGRAM(s) Oral before breakfast            Vital Signs Last 24 Hrs  T(C): 36.4 (21 Apr 2025 15:29), Max: 36.5 (21 Apr 2025 09:12)  T(F): 97.5 (21 Apr 2025 15:29), Max: 97.7 (21 Apr 2025 09:12)  HR: 89 (21 Apr 2025 15:29) (89 - 93)  BP: 133/84 (21 Apr 2025 15:29) (133/83 - 133/84)  BP(mean): --  RR: 17 (21 Apr 2025 15:29) (17 - 18)  SpO2: 98% (21 Apr 2025 15:29) (98% - 99%)    Parameters below as of 21 Apr 2025 15:29  Patient On (Oxygen Delivery Method): room air            I&O's Detail        LABS:                        12.2   8.54  )-----------( 256      ( 21 Apr 2025 10:34 )             39.1     04-21    145  |  110[H]  |  11.6  ----------------------------<  82  3.9   |  21.0[L]  |  0.78    Ca    9.5      21 Apr 2025 10:34    TPro  7.4  /  Alb  3.9  /  TBili  0.4  /  DBili  x   /  AST  19  /  ALT  9   /  AlkPhos  104  04-21          CAPILLARY BLOOD GLUCOSE        PT/INR - ( 21 Apr 2025 10:34 )   PT: 13.0 sec;   INR: 1.12 ratio         PTT - ( 21 Apr 2025 10:34 )  PTT:34.8 sec  Urinalysis Basic - ( 21 Apr 2025 10:34 )    Color: x / Appearance: x / SG: x / pH: x  Gluc: 82 mg/dL / Ketone: x  / Bili: x / Urobili: x   Blood: x / Protein: x / Nitrite: x   Leuk Esterase: x / RBC: x / WBC x   Sq Epi: x / Non Sq Epi: x / Bacteria: x      CULTURES:      Physical Examination:  GENERAL: In NAD   HEENT: NC/AT  NECK: Supple, trachea midline  PULM: CTA b/l   CVS: +S1, S2, RRR  ABD: Soft, non-tender  EXTREMITIES: No pedal edema B/L  SKIN: No open wounds  NEURO: Grossly non-focal    DEVICES:     RADIOLOGY: reviewed   no gallop and no friction rub.       No murmur heard.  Pulmonary/Chest: Effort normal and breath sounds normal.      No respiratory distress. No wheezes. No rales.      Exhibits no tenderness.   Abdominal:   Soft. Bowel sounds are normal. Exhibits no distension and      no mass. There is no tenderness.      There is no rebound and no guarding.   Musculoskeletal:  Right arm examination: Tenderness with swelling over the olecranon of the elbow. Pain with range of motion of the right elbow. No breaks to the skin. Radial head and humerus non-tender. Shoulder, wrist, and hand are non-tender. Good sensation to the arm. Good radial pulse.     Left lower extremity: Tenderness over the patella with slight swelling. No knee joint effusion. Remainder of the knee and leg non-tender.      Right leg and left arm are non-tender.   Lymphadenopathy:  No cervical adenopathy.   Neurological:   Alert.  Follows commands. Acting appropriate for age. Moving all extremities.  Skin:    Skin is warm and dry. No rash noted. No pallor.      Emergency Department Course     Imaging:  Radiology findings were communicated with the patient and her father who voiced understanding of the findings.    Elbow XR, G/E 3 views, right  Abnormal anterior/posterior fat pad signs. Radial head  lateral cortical irregularity suspicious for a minimally impacted  radial neck fracture. No displacement of the radial articular surface  is radiographically visible.  RAMYA ROCK MD  Reading per radiology    Knee XR, 3 views, left  Negative.  VERNON DAVIS MD  Reading per radiology    Procedures:   Splint Placement    PLACEMENT: Custom Orthoglass long arm splint was applied to the right upper extremity and after placement I checked and adjusted the fit to ensure proper positioning. The patient was more comfortable with the splint in place. Sensation and circulation are intact after splint placement.    Emergency Department Course:    2220 Nursing notes and vitals  reviewed.    2223 I performed an exam of the patient as documented above.     2251 The patient was sent for x-ray imaging while in the emergency department, results above.      2347 The patient was rechecked and updated on the results of her imaging studies. I personally reviewed the imaging results with the patient and her father and answered all related questions prior to discharge.    Impression & Plan      Medical Decision Making:  Leann Fraga is a 11 year old female who presents to the emergency department today for evaluation of right elbow pain and left knee pain. The patient has a impacted radial neck fracture which is closed so she was placed in a long arm splint and discharged to home to follow up with [facility]. She sustained a knee contusion but no fracture was noted on x-ray. There are no other injuries and no neurovascular problems. These results were discussed with the patient and her father and they understand and are in agreement with the plan. All questions were answered prior to discharge. The patient was discharged in good condition.     Diagnosis:    ICD-10-CM    1. Closed displaced fracture of head of right radius, initial encounter S52.121A    2. Contusion of left knee, initial encounter S80.02XA      Disposition:   The patient is discharged to home.     Scribe Disclosure:  I, Daniel Julio, am serving as a scribe at 10:23 PM on 2/17/2018 to document services personally performed by Maribeth Bravo MD, based on my observations and the provider's statements to me.     EMERGENCY DEPARTMENT       Maribeth Bravo MD  02/18/18 0358       Maribeth Bravo MD  02/18/18 3495

## 2025-04-21 NOTE — ED PROVIDER NOTE - OBJECTIVE STATEMENT
Pt is a 64 year old female presenting with cough.  symptoms present for the past several months.  Patient states she has seen pulmonologist, cardiologist, ENT with various treatments.  Cough has persisted.  Now for the past several days she has noted worsening hemoptysis.  Not currently on any steroids or antibiotics.  Uses albuterol pump for relief.  Denies any abdominal pain, nausea, vomiting remarkably stable.  No chest pain.  No lower extremity pain or swelling.  No fevers or chills.  No sore throat or nasal congestion.

## 2025-04-21 NOTE — PATIENT PROFILE ADULT - FUNCTIONAL ASSESSMENT - DAILY ACTIVITY ASSESSMENT TYPE
HISTORY AND PHYSICAL/PRE-SEDATION ASSESSMENT    Patient:  Fay Coy   :  1972  Medical Record No.:  [id number]   Date:  10/26/2020  Physician:  Suresh Anthony M.D. Facility: [address] [place] [address]    HISTORY OF PRESENT ILLNESS:                 The patient is a 49 y.o. female whom presents with low back and left leg pain. Review of the imaging and physical exam of the patient confirmed the pre-procedure diagnosis. After a thorough discussion of risks, benefits and alternatives informed consent was obtained. Past Medical History:   Past Medical History:   Diagnosis Date    Acne     Dr Bolivar Walter history reviewed with no changes       Past Surgical History:     Past Surgical History:   Procedure Laterality Date    CARPAL TUNNEL RELEASE Bilateral      SECTION  2001     x1     Current Medications:   Prior to Admission medications    Medication Sig Start Date End Date Taking? Authorizing Provider   TURMERIC PO Take by mouth   Yes Historical Provider, MD   APPLE CIDER VINEGAR PO Take by mouth   Yes Historical Provider, MD   Linoleic Acid-Sunflower Oil (CLA PO) Take by mouth   Yes Historical Provider, MD   oxyCODONE-acetaminophen (PERCOCET) 5-325 MG per tablet Take 1 tablet by mouth 2 times daily as needed for Pain for up to 30 days. Take no more than 2-3 tabs orally prn 9/28/20 10/28/20 Yes CHRISTA Sosa CNP   Lidocaine (ZTLIDO) 1.8 % [facility] Apply 1 patch topically daily 20 Yes CHRISTA Sosa CNP   amitriptyline (ELAVIL) 25 MG tablet Take 1 tablet by mouth nightly 20 Yes CHRISTA Sosa CNP   torsemide (DEMADEX) 10 MG tablet Take 1 tablet by mouth daily 20   Toñito Thomas MD   ELIQUIS 5 MG TABS tablet TAKE 1 TABLET BY MOUTH TWO TIMES A DAY  20   Toñito Thomas MD     Allergies:  Patient has no known allergies. Social History:    reports that she has never smoked.  She has never used smokeless tobacco. She reports that she does not drink alcohol or use drugs. Family History:   Family History   Problem Relation Age of Onset    No Known Problems Mother     No Known Problems Father     Other Brother         do not talk       Vitals: Blood pressure 112/79, pulse 64, temperature 98 °F (36.7 °C), temperature source Temporal, resp. rate 18, height 5' 4\" (1.626 m), weight 140 lb (63.5 kg), SpO2 100 %, not currently breastfeeding. PHYSICAL EXAM:including affected areas  HENT: Airway patent and reviewed  Cardiovascular: Normal rate, regular rhythm, normal heart sounds. Pulmonary/Chest: No wheezes. No rhonchi. No rales. Abdominal: Soft. Bowel sounds are normal. No distension. Extremities: Moves all extremities equally  Cervical and Lumbar Spine: Painful range of motion, no midline tenderness       Diagnosis:Lumbar radiculopathy  M54.16  M51.26  M47.816  M70.62    Plan: Proceed with planned procedure      ASA CLASS:         []   I. Normal, healthy adult           [x]   II.  Mild systemic disease            []   III. Severe systemic disease      Mallampati: Mallampati Class II - (soft palate, fauces & uvula are visible)      Sedation plan:   [x]  Local              []  Minimal                  []  General anesthesia    Patient's condition acceptable for planned procedure/sedation. Post Procedure Plan   Return to same level of care   ______________________     The patient was counseled at length about the risks of danie Covid-19 in the brett-operative and post-operative states including the recovery window of their procedure. The patient was made aware that danie Covid-19 after a surgical procedure may worsen their prognosis for recovering from the virus and lend to a higher morbidity and or mortality risk. The patient was given the options of postponing their procedure. All of the risks, benefits, and alternatives were discussed. The patient does wish to proceed with the procedure.      The risks and benefits as well as alternatives to the procedure have been discussed with the patient and or family. The patient and or next of kin understands and agrees to proceed.     Jillian Plascencia M.D. Admission

## 2025-04-22 ENCOUNTER — APPOINTMENT (OUTPATIENT)
Dept: THORACIC SURGERY | Facility: HOSPITAL | Age: 65
End: 2025-04-22

## 2025-04-22 ENCOUNTER — TRANSCRIPTION ENCOUNTER (OUTPATIENT)
Age: 65
End: 2025-04-22

## 2025-04-22 LAB
ALBUMIN SERPL ELPH-MCNC: 4.1 G/DL — SIGNIFICANT CHANGE UP (ref 3.3–5.2)
ALP SERPL-CCNC: 110 U/L — SIGNIFICANT CHANGE UP (ref 40–120)
ALT FLD-CCNC: 10 U/L — SIGNIFICANT CHANGE UP
ANION GAP SERPL CALC-SCNC: 15 MMOL/L — SIGNIFICANT CHANGE UP (ref 5–17)
APTT BLD: 33.9 SEC — SIGNIFICANT CHANGE UP (ref 24.5–35.6)
AST SERPL-CCNC: 22 U/L — SIGNIFICANT CHANGE UP
BILIRUB SERPL-MCNC: 0.3 MG/DL — LOW (ref 0.4–2)
BLD GP AB SCN SERPL QL: SIGNIFICANT CHANGE UP
BUN SERPL-MCNC: 11.6 MG/DL — SIGNIFICANT CHANGE UP (ref 8–20)
CALCIUM SERPL-MCNC: 10.2 MG/DL — SIGNIFICANT CHANGE UP (ref 8.4–10.5)
CHLORIDE SERPL-SCNC: 110 MMOL/L — HIGH (ref 96–108)
CO2 SERPL-SCNC: 18 MMOL/L — LOW (ref 22–29)
CREAT SERPL-MCNC: 0.77 MG/DL — SIGNIFICANT CHANGE UP (ref 0.5–1.3)
EGFR: 86 ML/MIN/1.73M2 — SIGNIFICANT CHANGE UP
EGFR: 86 ML/MIN/1.73M2 — SIGNIFICANT CHANGE UP
GLUCOSE SERPL-MCNC: 92 MG/DL — SIGNIFICANT CHANGE UP (ref 70–99)
HCT VFR BLD CALC: 41 % — SIGNIFICANT CHANGE UP (ref 34.5–45)
HGB BLD-MCNC: 12.9 G/DL — SIGNIFICANT CHANGE UP (ref 11.5–15.5)
INR BLD: 1.12 RATIO — SIGNIFICANT CHANGE UP (ref 0.85–1.16)
LEGIONELLA AG UR QL: NEGATIVE — SIGNIFICANT CHANGE UP
MCHC RBC-ENTMCNC: 26.9 PG — LOW (ref 27–34)
MCHC RBC-ENTMCNC: 31.5 G/DL — LOW (ref 32–36)
MCV RBC AUTO: 85.6 FL — SIGNIFICANT CHANGE UP (ref 80–100)
MRSA PCR RESULT.: SIGNIFICANT CHANGE UP
NRBC # BLD AUTO: 0 K/UL — SIGNIFICANT CHANGE UP (ref 0–0)
NRBC # FLD: 0 K/UL — SIGNIFICANT CHANGE UP (ref 0–0)
NRBC BLD AUTO-RTO: 0 /100 WBCS — SIGNIFICANT CHANGE UP (ref 0–0)
PLATELET # BLD AUTO: 308 K/UL — SIGNIFICANT CHANGE UP (ref 150–400)
PMV BLD: 12 FL — SIGNIFICANT CHANGE UP (ref 7–13)
POTASSIUM SERPL-MCNC: 3.8 MMOL/L — SIGNIFICANT CHANGE UP (ref 3.5–5.3)
POTASSIUM SERPL-SCNC: 3.8 MMOL/L — SIGNIFICANT CHANGE UP (ref 3.5–5.3)
PROT SERPL-MCNC: 7.9 G/DL — SIGNIFICANT CHANGE UP (ref 6.6–8.7)
PROTHROM AB SERPL-ACNC: 12.6 SEC — SIGNIFICANT CHANGE UP (ref 9.9–13.4)
RBC # BLD: 4.79 M/UL — SIGNIFICANT CHANGE UP (ref 3.8–5.2)
RBC # FLD: 14.8 % — HIGH (ref 10.3–14.5)
S AUREUS DNA NOSE QL NAA+PROBE: SIGNIFICANT CHANGE UP
S PNEUM AG UR QL: NEGATIVE — SIGNIFICANT CHANGE UP
SODIUM SERPL-SCNC: 142 MMOL/L — SIGNIFICANT CHANGE UP (ref 135–145)
WBC # BLD: 12.22 K/UL — HIGH (ref 3.8–10.5)
WBC # FLD AUTO: 12.22 K/UL — HIGH (ref 3.8–10.5)

## 2025-04-22 PROCEDURE — 88342 IMHCHEM/IMCYTCHM 1ST ANTB: CPT | Mod: 26,59

## 2025-04-22 PROCEDURE — 31624 DX BRONCHOSCOPE/LAVAGE: CPT

## 2025-04-22 PROCEDURE — 88112 CYTOPATH CELL ENHANCE TECH: CPT | Mod: 26,59

## 2025-04-22 PROCEDURE — 88360 TUMOR IMMUNOHISTOCHEM/MANUAL: CPT | Mod: 26,59

## 2025-04-22 PROCEDURE — 88104 CYTOPATH FL NONGYN SMEARS: CPT | Mod: 26,59

## 2025-04-22 PROCEDURE — 99222 1ST HOSP IP/OBS MODERATE 55: CPT | Mod: 25

## 2025-04-22 PROCEDURE — 88341 IMHCHEM/IMCYTCHM EA ADD ANTB: CPT | Mod: 26,59

## 2025-04-22 PROCEDURE — 31652 BRONCH EBUS SAMPLNG 1/2 NODE: CPT

## 2025-04-22 PROCEDURE — 31623 DX BRONCHOSCOPE/BRUSH: CPT

## 2025-04-22 PROCEDURE — 88305 TISSUE EXAM BY PATHOLOGIST: CPT | Mod: 26

## 2025-04-22 PROCEDURE — 88173 CYTOPATH EVAL FNA REPORT: CPT | Mod: 26,59

## 2025-04-22 PROCEDURE — 71045 X-RAY EXAM CHEST 1 VIEW: CPT | Mod: 26

## 2025-04-22 PROCEDURE — 99233 SBSQ HOSP IP/OBS HIGH 50: CPT

## 2025-04-22 PROCEDURE — 88172 CYTP DX EVAL FNA 1ST EA SITE: CPT | Mod: 26,59

## 2025-04-22 RX ORDER — MAGNESIUM, ALUMINUM HYDROXIDE 200-200 MG
30 TABLET,CHEWABLE ORAL EVERY 4 HOURS
Refills: 0 | Status: DISCONTINUED | OUTPATIENT
Start: 2025-04-22 | End: 2025-04-22

## 2025-04-22 RX ADMIN — Medication 1 DOSE(S): at 21:35

## 2025-04-22 RX ADMIN — TOPIRAMATE 75 MILLIGRAM(S): 25 TABLET, FILM COATED ORAL at 12:13

## 2025-04-22 RX ADMIN — Medication 650 MILLIGRAM(S): at 00:40

## 2025-04-22 RX ADMIN — FLUOXETINE HYDROCHLORIDE 20 MILLIGRAM(S): 20 CAPSULE ORAL at 12:13

## 2025-04-22 RX ADMIN — Medication 40 MILLIGRAM(S): at 05:25

## 2025-04-22 RX ADMIN — Medication 2.5 MILLIGRAM(S): at 02:52

## 2025-04-22 RX ADMIN — Medication 30 MILLILITER(S): at 09:39

## 2025-04-22 RX ADMIN — METOPROLOL SUCCINATE 50 MILLIGRAM(S): 50 TABLET, EXTENDED RELEASE ORAL at 05:25

## 2025-04-22 RX ADMIN — SUMATRIPTAN 50 MILLIGRAM(S): 100 TABLET, FILM COATED ORAL at 06:33

## 2025-04-22 RX ADMIN — METHYLPREDNISOLONE ACETATE 40 MILLIGRAM(S): 80 INJECTION, SUSPENSION INTRA-ARTICULAR; INTRALESIONAL; INTRAMUSCULAR; SOFT TISSUE at 05:25

## 2025-04-22 RX ADMIN — Medication 10 MILLIGRAM(S): at 12:13

## 2025-04-22 RX ADMIN — Medication 1 DOSE(S): at 09:47

## 2025-04-22 RX ADMIN — MONTELUKAST SODIUM 10 MILLIGRAM(S): 10 TABLET ORAL at 12:13

## 2025-04-22 RX ADMIN — CEFTRIAXONE 1000 MILLIGRAM(S): 500 INJECTION, POWDER, FOR SOLUTION INTRAMUSCULAR; INTRAVENOUS at 17:00

## 2025-04-22 RX ADMIN — Medication 2.5 MILLIGRAM(S): at 09:45

## 2025-04-22 RX ADMIN — Medication 250 MILLIGRAM(S): at 17:00

## 2025-04-22 RX ADMIN — Medication 2.5 MILLIGRAM(S): at 21:35

## 2025-04-22 NOTE — PRE-OP CHECKLIST - ASSESSMENT, HISTORY & PHYSICAL COMPLETED AND ON MEDICAL RECORD
[facility]  Transplant Nephrology Consult  Date of Admission:  12/8/2023  Today's Date: 12/09/2023  Requesting physician: Jhon White*    Recommendations:  - Give 1L IV fluid bolus and consider further fluid bolus after evaluating patient due to concern for capillary leak  - Change MPA to  mg IV BID and change tacrolimus to 0.5 mg sublingual BID (tac goal now 6-8, fluconazole started, and changing to SL tac)  - Tacrolimus goal 6-8.  - Agree with magnesium sulfate 2 g IV once.  -Depending on BP could consider restarting metoprolol at 12.5mg bid      Assessment & Plan   # DDKT (SLK): PATI, Scr increasing, UOP decreasing due to likely capillary leak, hypoperfusion.    - Baseline Creatinine: ~ 0.8-1   - Proteinuria: Normal (<0.2 grams)   - Date DSA Last Checked: Aug/2023      Latest DSA: No,  but history of DSA to Cq9 w/  at txp   - BK Viremia: No   - Kidney Tx Biopsy: Jun 20, 2023; Result: negative for rejection. Focal ATN   -Recommend IV fluid bolus, re-evaluating patient to see if he can tolerate another bolus based on pulmonary status     # Liver Tx (SLK): Stable liver graft function. Followed by transplant surgery.     # Immunosuppression Prior to Admission: Tacrolimus immediate release (goal 6-8), Mycophenolic acid (dose 540 mg every 12 hours), and Prednisone (dose 5 mg daily)   - Present Immunosuppression: Tacrolimus immediate release (goal 6-8, sublingual), Mycophenolate mofetil (dose 750 mg every 12 hours IV), and Prednisone (dose 5 mg daily)   - Patient is in an immunosuppressed state and will continue to monitor for efficacy and toxicity of immunosuppression medications.   - Changes: Yes - Change MPA to  mg IV BID and change tacrolimus to 0.5 mg sublingual BID.    # Infection Prophylaxis:   - PJP: Sulfa/TMP (Bactrim)  - CMV: None, prophylaxis completed    # Umbilical Hernia Mesh Repair: Pt underwent a redo mesh repair of umbilical  hernia and minimal DHEERAJ of small bowel 12/7/23 with Dr. Clifton. He presented 12/8/23 from home with severe LLQ abdominal pain and N/V 12/8/23.   - 12/8/23 CT a/p: 1.  There is a small volume of expected postoperative pneumoperitoneum. Mild-to-moderate volume of abdominal ascites which could be postsurgical in nature. No bowel perforation or obstruction.    - ciprofloxacin 12/8/23 - present   - vancomycin 12/09/23 - present   -Fluconazole started 12/9    # Paroxysmal A-Fib, now with RVR:    -patient had episode of unresponsiveness 12/8/23- possible seizure vs apnea. Extensive workup with evidence of A fib w RVR, s/p PO dose of metoprolol.  Received Digoxin 500 mcg given IV once.  On apixaban PTA.   -Possibly driven by capillary leak and hypoperfusion. Recommend IV fluids as above   -Hold anticoagulation immediately post op      #Blood Pressure: Hypotensive   Goal BP: MAP > 65   - Volume status: Total body volume up, but intravascularly hypovolemic     - Changes: Yes - Give 1L IV fluid bolus . Follow up after 1st bolus to see if he can tolerate another bolus based on pulmonary status. Depending on BP could consider restarting metoprolol at 12.5mg bid    Prior to admission antihypertensives: metoprolol tartrate 50 mg PO BID    # Steroid induced hyperglycemia: Controlled (HbA1c <7%) Last HbA1c: 5.2% (9/2023)   - Management as per primary team.    # Anemia in Chronic Renal Disease: Hgb: Trend up 2/2 hemoconcentration      FEDERICO: Yes but holding for Hb>10   - Iron studies:  22 (11/21/2023) . Recently completed IV iron.    -Hb likely increasing due to capillary leak. Continue IVF as above    # Mineral Bone Disorder:   - Secondary renal hyperparathyroidism; PTH level: Normal (15-65 pg/ml)        On treatment: None  - Vitamin D; level: Normal        On supplement: No  - Calcium; level: Low (trend for now)        On supplement: No  - Phosphorus; level: Normal          On supplement: No    # Electrolytes:   - Potassium;  level: Normal        On supplement: No  - Magnesium; level: Low normal        On supplement: Yes, magnesium oxide 800 mg PO BID.  Agree with magnesium sulfate 2 g IV once.  - Bicarbonate; level: Low (trend for now)        On supplement: No    # Hx of Neuropathy: On Gabapenin  (100 mg in am, 200 mg in the afternoon and 100 mg at bedtime by mouth daily)     # ANCA Vasculitis: S/p Rituximab x2     # Hx of Gout: On prednisone 5mg daily.                -Continue allopurinol 300mg daily.    -Takes anakinra 100mg with gout symptoms.               -Follows with [facility]ology at [facility], Dr. Rucker.     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    Seen and discussed with Dr. Dutta.    JADE Tiwari CNP  Pager: [phone]      Physician Attestation     I saw and evaluated Damion Quinones as part of a shared APRN/PA visit.     I personally reviewed the vital signs, medications, labs and imaging.    I personally performed the substantive portion of the medical decision making for this visit - please see the GEORGE's documentation for full details.    Key management decisions made by me and carried out under my direction: Pt is hemoconcentrated, evidence of capillary leak and intravascular hypotension which is likely driving Afib w/ RVR. Recommend IV fluid bolus, reassessing patient and seeing if he  "can tolerate more fluids based on pulmonary status. Unclear what is causing capillary leak. Recommend continuing broad spectrum abx. S/p digoxin load. Hold off on further digoxin as patient now has PATI and is oliguric. Lactic acidosis: recommend trending lactate, IVF as above.     I personally performed the substantive portion of the history for this visit - please see the GEORGE's documentation for full details.  Key additional history findings made by me: repeat CT A/P with increasing ascites fitting with capillary leak. Pt denies further pain. He was dizzy this morning and \"blacked out\".     Subhash Dutta MD  Date of Service (when I saw the patient): 12/09/23    REASON FOR CONSULT   History of Kidney Transplant    History of Present Illness   Damion Quinones is a 65 year old male with a history of PAF, obesity, HTN, pre-diabetes, rheumatoid and psoriatic arthritis, CAD, and cirrhosis (alcohol + hemochromatosis) and ESKD (IgA nephropathy + ANCA vasculitis) s/p SLK 6/6/23. He underwent a redo mesh repair of umbilical hernia and minimal DHEERAJ of small bowel 12/7/23 with Dr. Clifton. He presented yesterday from home with severe LLQ abdominal pain and N/V. In ED, patient had episode of unresponsiveness- possible seizure vs apnea. Extensive workup with evidence of A fib w RVR, s/p PO dose of metoprolol. One dose of digoxin also given. His serum creatinine today of 1.08 is near baseline of ~ 0.8-1.0.     SBP mostly 90s-120s overnight. Afebrile. No chest pain. On  O2 5 LPM via NC. 1+ leg swelling.  Pt reports nausea better after placement of NG tube.         Review of Systems    The 10 point Review of Systems is negative other than noted in the HPI or here.     Past Medical History    I have reviewed this patient's medical history and updated it with pertinent information if needed.   Past Medical History:   Diagnosis Date     Alcoholic cirrhosis of liver with ascites (H) 10/11/2019     ANCA-associated vasculitis (H) 2022 "     Antiplatelet or antithrombotic long-term use      C. difficile colitis      Coronary artery disease     [facility] 4/2023 - complete occlusion of RCA     ESRD (end stage renal disease) on dialysis (H)      Gout      HCC (hepatocellular carcinoma) (H) 09/05/2023     History of hemochromatosis 10/11/2019     Hypertension      IgA nephropathy      Obesity      PAF (paroxysmal atrial fibrillation) (H)      Pre-diabetes 2023     Psoriatic arthritis (H)      RA (rheumatoid arthritis) (H)      Status post kidney transplant 06/06/2023    Induction with thymoglobulin 4mg/kg, + DSA CW9     Status post liver transplantation (H) 06/06/2023       Past Surgical History   I have reviewed this patient's surgical history and updated it with pertinent information if needed.  Past Surgical History:   Procedure Laterality Date     APPENDECTOMY      Removed at 15 Years Old      BENCH KIDNEY  06/06/2023    Procedure: Bench kidney;  Surgeon: Chad Clifton MD;  Location:  OR     [facility] LIVER  06/06/2023    Procedure: Bench liver;  Surgeon: Chad Clifton MD;  Location:  OR     COLONOSCOPY      2014 at [facility]      CV CORONARY ANGIOGRAM N/A 04/27/2023    Procedure: Coronary Angiogram;  Surgeon: Pablo Araujo MD;  Location:  HEART CARDIAC CATH LAB     EYE SURGERY Bilateral     Cataract     H STATISTIC PICC LINE INSERTION >5YR, FAILED Left 06/16/2023    Unable to advance catheter over the axillary area     HERNIA REPAIR      History of bilateral inguinal hernia repair: 10/28/2014. Open hernia repair: 10/2017. Abdominal wound exploration and debridement 12/27/2017     INSERT SHUNT PORTAL TRANSJUGULAR INTRAHEPTIC  09/26/2022     IR CVC NON TUNNEL LINE REMOVAL  7/3/2023     IR CVC NON TUNNEL PLACEMENT > 5 YRS  6/14/2023     IR CVC TUNNEL PLACEMENT > 5 YRS OF AGE  01/26/2023     IR PICC PLACEMENT > 5 YRS OF AGE  6/16/2023     IR RENAL BIOPSY RIGHT  6/20/2023     RETURN LIVER TRANSPLANT N/A 06/06/2023     Procedure: Return liver transplant. Intra-operative ultrasound;  Surgeon: Chad Clifton MD;  Location: [facility] OR     TRANSPLANT KIDNEY RECIPIENT  DONOR N/A 2023    Procedure: Transplant kidney recipient  donor, ureteral stent placement;  Surgeon: Chad Clifton MD;  Location: [facility] OR     TRANSPLANT LIVER RECIPIENT  DONOR N/A 2023    Procedure: Transplant liver recipient  donor;  Surgeon: Chad Clifton MD;  Location: [facility] OR       Family History   I have reviewed this patient's family history and updated it with pertinent information if needed.   Family History   Problem Relation Age of Onset     Lung Cancer Mother      Colon Cancer Father      Lung Cancer Brother      Heart Failure Brother      Kidney Disease Brother        Social History   I have reviewed this patient's social history and updated it with pertinent information if needed. Damion Quinones  reports that he has never smoked. He has never been exposed to tobacco smoke. He has never used smokeless tobacco. He reports that he does not currently use alcohol. He reports that he does not currently use drugs.    Allergies   No Known Allergies  Prior to Admission Medications     allopurinol  300 mg Oral Daily     atorvastatin  40 mg Oral QPM     bisacodyl  10 mg Rectal Once     ciprofloxacin  500 mg Oral BID     gabapentin  100 mg Oral BID     gabapentin  200 mg Oral Daily     levothyroxine  88 mcg Oral Daily     magnesium oxide  800 mg Oral BID     metoprolol tartrate  50 mg Oral BID     mycophenolic acid  540 mg Oral BID IS     pantoprazole  40 mg Oral QAM AC     predniSONE  5 mg Oral Daily     senna-docusate  1 tablet Oral At Bedtime     sodium chloride (PF)  3 mL Intracatheter Q8H     sulfamethoxazole-trimethoprim  1 tablet Oral Daily     tacrolimus  1.5 mg Oral BID IS       lactated ringers 125 mL/hr at 23 0551       Physical Exam   Temp  Av.9  F (36.6  C)  Min: 97.6  F (36.4  C)  Max:  98.2  F (36.8  C)      Pulse  Av.8  Min: 70  Max: 138 Resp  Av.2  Min: 12  Max: 31  SpO2  Av %  Min: 85 %  Max: 100 %     /68   Pulse 115   Temp 97.7  F (36.5  C) (Oral)   Resp 14   SpO2 97%     Admit       GENERAL APPEARANCE: alert and mild distress  HENT: mouth without ulcers or lesions  RESP: clear, diminished at bases  CV: irregular rhythm  EDEMA: 1+ LE edema bilaterally  ABDOMEN: soft, nondistended, nontender, bowel sounds normal  MS: extremities normal - no gross deformities noted, no evidence of inflammation in joints, no muscle tenderness  SKIN: no rash  PSYCH: mentation appears normal and affect normal/bright    Data   CMP  Recent Labs   Lab 23  0159 23  0813    139 140 139  --    POTASSIUM 4.9 4.2 4.3 3.7  --    CHLORIDE 104  --  103 105  --    CO2 21*  --  25 22  --    ANIONGAP 12  --  12 12  --    * 165* 169* 186*  --    BUN 20.3  --  16.8 18.9  --    CR 1.08  --  1.00 0.96  1.1  --    GFRESTIMATED 76  --  83 >60  87  --    NAZARIO 8.6*  --  8.9 8.5*  --    MAG 1.8  --  1.4*  --  1.7   PHOS 3.0  --  2.2*  --  2.0*   PROTTOTAL  --   --   --  5.6*  --    ALBUMIN  --   --   --  3.8  --    BILITOTAL  --   --   --  0.4  --    ALKPHOS  --   --   --  133  --    AST  --   --   --  19  --    ALT  --   --   --  10  --      CBC  Recent Labs   Lab 23  0554 23  0813   HGB 17.7 18.4* 17.1 14.5 12.7*   WBC 5.0  --  5.9 7.0 3.2*   RBC 6.01*  --  5.87 4.96  --    HCT 56.7* 54* 54.5* 46.8 41.9   MCV 94  --  93 94  --    MCH 29.5  --  29.1 29.2  --    MCHC 31.2*  --  31.4* 31.0*  --    RDW 18.2*  --  18.3* 16.9*  --      --  287 197  --      INRNo lab results found in last 7 days.  ABGNo lab results found in last 7 days.   Urine Studies  Recent Labs   Lab Test 23  0753 23  1157 23  0759 23  1645 23  1023 23  0715   COLOR Yellow Dark  Yellow* Yellow Yellow   < > Yellow   APPEARANCE Clear Clear Clear Clear   < > Clear   URINEGLC Negative 50* Negative 100*   < > Negative   URINEBILI Small* Small* Small* Negative   < > Negative   URINEKETONE Negative Negative Negative Negative   < > Negative   SG 1.015 1.029 1.020 1.015   < > 1.010   UBLD Negative Negative Negative Negative   < > Negative   URINEPH 7.0 6.0 7.0 7.0   < > 6.5   PROTEIN Trace* 70* 30* Negative   < > Negative   UROBILINOGEN 0.2  --  0.2 0.2  --  0.2   NITRITE Negative Negative Negative Negative   < > Negative   LEUKEST Trace* Negative Negative Negative   < > Small*   RBCU 0-2 1 None Seen  --   --  0-2   WBCU 10-25* 7* None Seen  --   --  0-5    < > = values in this interval not displayed.     No lab results found.  PTH  Recent Labs   Lab Test 09/20/23  1611 08/01/23  0924 05/19/23  0956   PTHI 24 45 67*     Iron Studies  Recent Labs   Lab Test 11/21/23  0742 11/07/23  0753 10/05/23  0752 09/05/23  0702 08/07/23  0726 08/01/23  0924 11/22/22  0848   IRON 60* 53* 83 32* 28* 23* 68    226* 241 258 244 231* 219*   IRONSAT 22 23 34 12* 11* 10* 31   HOLA 204 341 919* 485* 440* 506* 429*       IMAGING:  All imaging studies reviewed by me.     done

## 2025-04-22 NOTE — BRIEF OPERATIVE NOTE - NSICDXBRIEFPROCEDURE_GEN_ALL_CORE_FT
PROCEDURES:  Bronchoscopy, with diagnostic EBUS, with biopsy or washing if indicated 22-Apr-2025 14:35:58  Marco A Rowell

## 2025-04-23 LAB
ALBUMIN SERPL ELPH-MCNC: 3.6 G/DL — SIGNIFICANT CHANGE UP (ref 3.3–5.2)
ALP SERPL-CCNC: 95 U/L — SIGNIFICANT CHANGE UP (ref 40–120)
ALT FLD-CCNC: 9 U/L — SIGNIFICANT CHANGE UP
ANION GAP SERPL CALC-SCNC: 13 MMOL/L — SIGNIFICANT CHANGE UP (ref 5–17)
AST SERPL-CCNC: 14 U/L — SIGNIFICANT CHANGE UP
BILIRUB SERPL-MCNC: <0.2 MG/DL — LOW (ref 0.4–2)
BUN SERPL-MCNC: 16.7 MG/DL — SIGNIFICANT CHANGE UP (ref 8–20)
CALCIUM SERPL-MCNC: 9.3 MG/DL — SIGNIFICANT CHANGE UP (ref 8.4–10.5)
CHLORIDE SERPL-SCNC: 110 MMOL/L — HIGH (ref 96–108)
CO2 SERPL-SCNC: 20 MMOL/L — LOW (ref 22–29)
CREAT SERPL-MCNC: 0.78 MG/DL — SIGNIFICANT CHANGE UP (ref 0.5–1.3)
EGFR: 84 ML/MIN/1.73M2 — SIGNIFICANT CHANGE UP
EGFR: 84 ML/MIN/1.73M2 — SIGNIFICANT CHANGE UP
GLUCOSE SERPL-MCNC: 91 MG/DL — SIGNIFICANT CHANGE UP (ref 70–99)
HCT VFR BLD CALC: 36.9 % — SIGNIFICANT CHANGE UP (ref 34.5–45)
HGB BLD-MCNC: 11.7 G/DL — SIGNIFICANT CHANGE UP (ref 11.5–15.5)
MAGNESIUM SERPL-MCNC: 2.2 MG/DL — SIGNIFICANT CHANGE UP (ref 1.6–2.6)
MCHC RBC-ENTMCNC: 26.8 PG — LOW (ref 27–34)
MCHC RBC-ENTMCNC: 31.7 G/DL — LOW (ref 32–36)
MCV RBC AUTO: 84.6 FL — SIGNIFICANT CHANGE UP (ref 80–100)
NRBC # BLD AUTO: 0 K/UL — SIGNIFICANT CHANGE UP (ref 0–0)
NRBC # FLD: 0 K/UL — SIGNIFICANT CHANGE UP (ref 0–0)
NRBC BLD AUTO-RTO: 0 /100 WBCS — SIGNIFICANT CHANGE UP (ref 0–0)
PHOSPHATE SERPL-MCNC: 2.8 MG/DL — SIGNIFICANT CHANGE UP (ref 2.4–4.7)
PLATELET # BLD AUTO: 272 K/UL — SIGNIFICANT CHANGE UP (ref 150–400)
PMV BLD: 11.8 FL — SIGNIFICANT CHANGE UP (ref 7–13)
POTASSIUM SERPL-MCNC: 3.7 MMOL/L — SIGNIFICANT CHANGE UP (ref 3.5–5.3)
POTASSIUM SERPL-SCNC: 3.7 MMOL/L — SIGNIFICANT CHANGE UP (ref 3.5–5.3)
PROT SERPL-MCNC: 6.8 G/DL — SIGNIFICANT CHANGE UP (ref 6.6–8.7)
RBC # BLD: 4.36 M/UL — SIGNIFICANT CHANGE UP (ref 3.8–5.2)
RBC # FLD: 15.3 % — HIGH (ref 10.3–14.5)
SODIUM SERPL-SCNC: 143 MMOL/L — SIGNIFICANT CHANGE UP (ref 135–145)
WBC # BLD: 10.52 K/UL — HIGH (ref 3.8–10.5)
WBC # FLD AUTO: 10.52 K/UL — HIGH (ref 3.8–10.5)

## 2025-04-23 PROCEDURE — 74177 CT ABD & PELVIS W/CONTRAST: CPT | Mod: 26

## 2025-04-23 PROCEDURE — 99223 1ST HOSP IP/OBS HIGH 75: CPT

## 2025-04-23 PROCEDURE — 99233 SBSQ HOSP IP/OBS HIGH 50: CPT

## 2025-04-23 RX ADMIN — Medication 30 MILLILITER(S): at 21:39

## 2025-04-23 RX ADMIN — Medication 10 MILLIGRAM(S): at 10:52

## 2025-04-23 RX ADMIN — Medication 1 DOSE(S): at 21:57

## 2025-04-23 RX ADMIN — Medication 1 DOSE(S): at 09:07

## 2025-04-23 RX ADMIN — METOPROLOL SUCCINATE 50 MILLIGRAM(S): 50 TABLET, EXTENDED RELEASE ORAL at 05:07

## 2025-04-23 RX ADMIN — Medication 2.5 MILLIGRAM(S): at 09:08

## 2025-04-23 RX ADMIN — Medication 30 MILLILITER(S): at 16:09

## 2025-04-23 RX ADMIN — FLUOXETINE HYDROCHLORIDE 20 MILLIGRAM(S): 20 CAPSULE ORAL at 10:52

## 2025-04-23 RX ADMIN — MONTELUKAST SODIUM 10 MILLIGRAM(S): 10 TABLET ORAL at 10:52

## 2025-04-23 RX ADMIN — Medication 250 MILLIGRAM(S): at 15:59

## 2025-04-23 RX ADMIN — Medication 40 MILLIGRAM(S): at 05:08

## 2025-04-23 RX ADMIN — METHYLPREDNISOLONE ACETATE 40 MILLIGRAM(S): 80 INJECTION, SUSPENSION INTRA-ARTICULAR; INTRALESIONAL; INTRAMUSCULAR; SOFT TISSUE at 05:07

## 2025-04-23 RX ADMIN — AMLODIPINE BESYLATE 5 MILLIGRAM(S): 10 TABLET ORAL at 05:07

## 2025-04-23 RX ADMIN — Medication 2.5 MILLIGRAM(S): at 14:09

## 2025-04-23 RX ADMIN — Medication 30 MILLILITER(S): at 05:14

## 2025-04-23 RX ADMIN — SUMATRIPTAN 50 MILLIGRAM(S): 100 TABLET, FILM COATED ORAL at 10:56

## 2025-04-23 RX ADMIN — SUMATRIPTAN 50 MILLIGRAM(S): 100 TABLET, FILM COATED ORAL at 11:39

## 2025-04-23 RX ADMIN — CEFTRIAXONE 1000 MILLIGRAM(S): 500 INJECTION, POWDER, FOR SOLUTION INTRAMUSCULAR; INTRAVENOUS at 16:00

## 2025-04-23 RX ADMIN — TOPIRAMATE 75 MILLIGRAM(S): 25 TABLET, FILM COATED ORAL at 10:53

## 2025-04-23 RX ADMIN — LOSARTAN POTASSIUM 50 MILLIGRAM(S): 100 TABLET, FILM COATED ORAL at 05:08

## 2025-04-23 NOTE — CHART NOTE - NSCHARTNOTEFT_GEN_A_CORE
pt POD 1 s/p EBUS, reports sore throat but improving  heme/onc consult appreciated  pt can f/u with Dr. Ceja in 2 weeks   pt can call [phone] for appointment, our office is located on the first floor of the hospital     thoracic surgery to sign off  please reconsult as needed    d/w Dr. Ceja

## 2025-04-23 NOTE — CONSULT NOTE ADULT - NS ATTEND AMEND GEN_ALL_CORE FT
Agree with above assessment and plan.
I have discussed my recommendation with the PA which are outlined above.  Will sign off

## 2025-04-23 NOTE — CONSULT NOTE ADULT - SUBJECTIVE AND OBJECTIVE BOX
Hematology Consult Note    HPI:  64 year old female presenting with cough.  symptoms present for the past several months new onset hemoptysis.  CT angio chest reported RUL mass pressing bronchus. s/p EBUS/BX 4/22.    Allergies    No Known Allergies    Intolerances        MEDICATIONS  (STANDING):  albuterol    0.083% 2.5 milliGRAM(s) Nebulizer every 6 hours  albuterol    90 MICROgram(s) HFA Inhaler 1 Puff(s) Inhalation every 4 hours  amLODIPine   Tablet 5 milliGRAM(s) Oral daily  azithromycin  IVPB 500 milliGRAM(s) IV Intermittent every 24 hours  azithromycin  IVPB      cefTRIAXone Injectable. 1000 milliGRAM(s) IV Push every 24 hours  cetirizine 10 milliGRAM(s) Oral daily  FLUoxetine 20 milliGRAM(s) Oral daily  fluticasone propionate/ salmeterol 100-50 MICROgram(s) Diskus 1 Dose(s) Inhalation two times a day  losartan 50 milliGRAM(s) Oral daily  methylPREDNISolone sodium succinate Injectable 40 milliGRAM(s) IV Push daily  metoprolol succinate ER 50 milliGRAM(s) Oral daily  montelukast 10 milliGRAM(s) Oral daily  pantoprazole    Tablet 40 milliGRAM(s) Oral before breakfast  topiramate 75 milliGRAM(s) Oral daily    MEDICATIONS  (PRN):  acetaminophen     Tablet .. 650 milliGRAM(s) Oral every 6 hours PRN Temp greater or equal to 38C (100.4F), Mild Pain (1 - 3)  albuterol    0.083% 2.5 milliGRAM(s) Nebulizer every 4 hours PRN Bronchospasm  aluminum hydroxide/magnesium hydroxide/simethicone Suspension 30 milliLiter(s) Oral every 4 hours PRN Dyspepsia  melatonin 3 milliGRAM(s) Oral at bedtime PRN Insomnia  ondansetron Injectable 4 milliGRAM(s) IV Push every 8 hours PRN Nausea and/or Vomiting  SUMAtriptan 50 milliGRAM(s) Oral two times a day PRN Migraine      PAST MEDICAL & SURGICAL HISTORY:  Hypertension      Asthma      Rheumatoid Arthritis      S/P Breast Biopsy, Left      S/P D&C      S/P Arthroscopic Surgery of Left Knee          FAMILY HISTORY:      SOCIAL HISTORY: No EtOH, no tobacco    REVIEW OF SYSTEMS:    CONSTITUTIONAL: No weakness, fevers or chills  EYES/ENT: No visual changes;  No vertigo or throat pain   NECK: No pain or stiffness  RESPIRATORY: No cough, wheezing, hemoptysis; No shortness of breath  CARDIOVASCULAR: No chest pain or palpitations  GASTROINTESTINAL: No abdominal or epigastric pain. No nausea, vomiting, or hematemesis; No diarrhea or constipation. No melena or hematochezia.  GENITOURINARY: No dysuria, frequency or hematuria  NEUROLOGICAL: No numbness or weakness  SKIN: No itching, burning, rashes, or lesions   All other review of systems is negative unless indicated above.    Height (cm): 162.6 (04-22 @ 13:20)  Weight (kg): 84.4 (04-22 @ 13:20)  BMI (kg/m2): 31.9 (04-22 @ 13:20)  BSA (m2): 1.9 (04-22 @ 13:20)    T(F): 97.9 (04-23-25 @ 04:33), Max: 97.9 (04-22-25 @ 14:32)  HR: 78 (04-23-25 @ 05:05)  BP: 120/81 (04-23-25 @ 05:05)  RR: 18 (04-23-25 @ 04:33)  SpO2: 98% (04-23-25 @ 04:33)  Wt(kg): --    GENERAL: NAD, well-developed  HEAD:  Atraumatic, Normocephalic  EYES: EOMI, PERRLA, conjunctiva and sclera clear  NECK: Supple, No JVD  CHEST/LUNG: Clear to auscultation bilaterally; No wheeze  HEART: Regular rate and rhythm; No murmurs, rubs, or gallops  ABDOMEN: Soft, Nontender, Nondistended; Bowel sounds present  EXTREMITIES:  2+ Peripheral Pulses, No clubbing, cyanosis, or edema  NEUROLOGY: non-focal  SKIN: No rashes or lesions                          11.7   10.52 )-----------( 272      ( 23 Apr 2025 06:24 )             36.9       04-23    143  |  110[H]  |  16.7  ----------------------------<  91  3.7   |  20.0[L]  |  0.78    Ca    9.3      23 Apr 2025 06:24  Phos  2.8     04-23  Mg     2.2     04-23    TPro  6.8  /  Alb  3.6  /  TBili  <0.2[L]  /  DBili  x   /  AST  14  /  ALT  9   /  AlkPhos  95  04-23      Magnesium: 2.2 mg/dL (04-23 @ 06:24)  Phosphorus: 2.8 mg/dL (04-23 @ 06:24)    < from: CT Angio Chest PE Protocol w/ IV Cont (04.21.25 @ 10:39) >  ACC: [id number] EXAM:  CT ANGIO CHEST PULM [facility]   ORDERED BY: SEUN BROOKS     PROCEDURE DATE:  04/21/2025          INTERPRETATION:  CLINICAL INFORMATION: Hemoptysis. Smoking history.    COMPARISON: None.    CONTRAST/COMPLICATIONS:  IVContrast: Omnipaque 350  70 cc administered   30 cc discarded  Oral Contrast: NONE  .    PROCEDURE:  CT Angiography of the Chest.  Sagittal and coronal reformats were performed as well as 3D (MIP)   reconstructions.    FINDINGS:    LUNGS AND LARGE AIRWAYS: There is a 2.7 x 2.5 cm right suprahilar mass   encasing and obstructing the right upper lobe bronchus. There is mucous   plugging extending into the right upper lobe airways without significant   volume loss.  PLEURA: No pleural effusion. No pneumothorax or pneumomediastinum.  VESSELS: There is encasement and mild narrowing of the right upper lobe   pulmonary artery and proximal segmental branches. Otherwise, there is no   evidence of filling defect in the first, second, or third order branches   of the pulmonary arteries. The pulmonary trunk and main pulmonary   arteries are unremarkable. The thoracic aorta and great vessels are   unremarkable.  HEART: Heart size is normal. No pericardial effusion.  MEDIASTINUM AND DEE DEE: There are matted hypoenhancing right hilar lymph   nodes. There are shotty pretracheal and subcarinal lymph nodes. There is   no left hilar adenopathy.  CHEST WALL AND LOWER NECK: Within normal limits.  VISUALIZED UPPER ABDOMEN: Sleeve gastrectomy. Cholecystectomy. Moderate   central intrahepatic and common bile duct dilatation, likely related to a   postcholecystectomy reservoir effect.  BONES: Within normal limits.    IMPRESSION: 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 Recommend bronchoscopy for   tissue sampling. Results discussed with Dr. Brooks at time of   interpretation.    --- End of Report ---

## 2025-04-23 NOTE — CONSULT NOTE ADULT - ASSESSMENT
in progress   64 year old female presenting with cough.  symptoms present for the past several months new onset hemoptysis.  CT angio chest reported RUL mass pressing bronchus. s/p EBUS/BX 4/22.    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 Ceja 4/22    RECS  -f/u BX 4/22  -CT A/P  to assess POD  -MRI H w/wo to r/o brain mets   -send CEA  -Patient  can f/u OP with Heme/ONC Dr. Mccain  when deemed stable for d/c- [facility] [address] [place] [address] [phone]     *Note not finalized until signed by Attending Physician    Thank you for the referral. Will continue to monitor the patient.  Please call with any questions [phone]  Above reviewed with Attending Dr. Barber     [facility]  [address] [place] [address] [phone]  in progress   64 year old female presenting with cough.  symptoms present for the past several months new onset hemoptysis.  CT angio chest reported RUL mass pressing bronchus. s/p EBUS/BX 4/22.    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 Ceja 4/22    RECS  -f/u BX 4/22  -CT A/P  to assess POD  -MRI H w/wo to r/o brain mets   -send CEA  -Patient  can f/u OP with Heme/ONC Dr. Mccain  when deemed stable for d/c- [facility] [address] [place] [address] [phone]     *Note not finalized until signed by Attending Physician    Thank you for the referral. Will continue to monitor the patient.  Please call with any questions [phone]  Above reviewed with Attending Dr. Barber     [facility]  [address] [place] [address] [phone]    64 year old female presenting with cough.  symptoms present for the past several months new onset hemoptysis.  CT angio chest reported RUL mass pressing bronchus. s/p EBUS/BX 4/22.    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 Ceja 4/22    RECS  -f/u BX 4/22  -CT A/P  to assess POD  -MRI H w/wo to r/o brain mets   -send CEA  -Patient  can f/u OP with Heme/ONC Dr. Mccain  when deemed stable for d/c- [facility] [address] [place] [address] [phone]     *Note not finalized until signed by Attending Physician    Thank you for the referral. Will continue to monitor the patient.  Please call with any questions [phone]  Above reviewed with Attending Dr. Barber     [facility]  [address] [place] [address] [phone]

## 2025-04-23 NOTE — CONSULT NOTE ADULT - TIME BILLING
greater than 50% of time spent reviewing labs, notes, orders and radiographs, coordinating care  discussed with nursing, primary team,  consultants
MDM

## 2025-04-23 NOTE — CONSULT NOTE ADULT - CONSULT REASON
Risk Stratification
suprahilar mass
new lung mass
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

## 2025-04-24 PROCEDURE — 99232 SBSQ HOSP IP/OBS MODERATE 35: CPT

## 2025-04-24 PROCEDURE — 70553 MRI BRAIN STEM W/O & W/DYE: CPT | Mod: 26

## 2025-04-24 RX ADMIN — Medication 1 DOSE(S): at 20:16

## 2025-04-24 RX ADMIN — Medication 1 DOSE(S): at 10:30

## 2025-04-24 RX ADMIN — Medication 250 MILLIGRAM(S): at 16:29

## 2025-04-24 RX ADMIN — CEFTRIAXONE 1000 MILLIGRAM(S): 500 INJECTION, POWDER, FOR SOLUTION INTRAMUSCULAR; INTRAVENOUS at 16:30

## 2025-04-24 NOTE — PROGRESS NOTE ADULT - SUBJECTIVE AND OBJECTIVE BOX
[facility] Division of Hospital Medicine  Virginia Zamudio MD   I'm reachable on [employer] Teams      Patient is a 64y old  Female who presents with a chief complaint of Hemoptysis (23 Apr 2025 17:36)      SUBJECTIVE / OVERNIGHT EVENTS:   Patient was seen and examined during rounds  Denied any pain. Cough is improving. Awaitng MRI      12 points ROS negative except above    MEDICATIONS  (STANDING):  albuterol    90 MICROgram(s) HFA Inhaler 1 Puff(s) Inhalation every 4 hours  amLODIPine   Tablet 5 milliGRAM(s) Oral daily  azithromycin  IVPB 500 milliGRAM(s) IV Intermittent every 24 hours  azithromycin  IVPB      cefTRIAXone Injectable. 1000 milliGRAM(s) IV Push every 24 hours  cetirizine 10 milliGRAM(s) Oral daily  FLUoxetine 20 milliGRAM(s) Oral daily  fluticasone propionate/ salmeterol 100-50 MICROgram(s) Diskus 1 Dose(s) Inhalation two times a day  losartan 50 milliGRAM(s) Oral daily  methylPREDNISolone sodium succinate Injectable 40 milliGRAM(s) IV Push daily  metoprolol succinate ER 50 milliGRAM(s) Oral daily  montelukast 10 milliGRAM(s) Oral daily  pantoprazole    Tablet 40 milliGRAM(s) Oral before breakfast  topiramate 75 milliGRAM(s) Oral daily    MEDICATIONS  (PRN):  acetaminophen     Tablet .. 650 milliGRAM(s) Oral every 6 hours PRN Temp greater or equal to 38C (100.4F), Mild Pain (1 - 3)  albuterol    0.083% 2.5 milliGRAM(s) Nebulizer every 4 hours PRN Bronchospasm  aluminum hydroxide/magnesium hydroxide/simethicone Suspension 30 milliLiter(s) Oral every 4 hours PRN Dyspepsia  melatonin 3 milliGRAM(s) Oral at bedtime PRN Insomnia  ondansetron Injectable 4 milliGRAM(s) IV Push every 8 hours PRN Nausea and/or Vomiting  SUMAtriptan 50 milliGRAM(s) Oral two times a day PRN Migraine        I&O's Summary    23 Apr 2025 07:01  -  24 Apr 2025 07:00  --------------------------------------------------------  IN: 1450 mL / OUT: 0 mL / NET: 1450 mL    24 Apr 2025 07:01  -  24 Apr 2025 16:50  --------------------------------------------------------  IN: 0 mL / OUT: 0 mL / NET: 0 mL        PHYSICAL EXAM:  Vital Signs Last 24 Hrs  T(C): 36.6 (24 Apr 2025 16:36), Max: 36.6 (24 Apr 2025 04:30)  T(F): 97.9 (24 Apr 2025 16:36), Max: 97.9 (24 Apr 2025 16:36)  HR: 81 (24 Apr 2025 16:36) (70 - 89)  BP: 120/83 (24 Apr 2025 16:36) (120/83 - 149/85)  BP(mean): --  RR: 18 (24 Apr 2025 16:36) (17 - 18)  SpO2: 98% (24 Apr 2025 16:36) (95% - 98%)    Parameters below as of 24 Apr 2025 16:36  Patient On (Oxygen Delivery Method): room air        CONSTITUTIONAL: NAD, Not in acute distress  EYES:  EOMI, conjunctiva and sclera clear  ENMT: , neck supple , non-tender  RESPIRATORY: Normal respiratory effort; lungs are clear to auscultation bilaterally  CARDIOVASCULAR: S1S2 present  ABDOMEN: soft. bowel sound present  MUSCLOSKELETAL: ; no clubbing or cyanosis of digits;   PSYCH: A+O to person, place, and time; affect appropriate  NEUROLOGY: CN, motor and sensory intact   SKIN: No rashes; no palpable lesions  Extremity: No bilateral edema      LABS:                        11.7   10.52 )-----------( 272      ( 23 Apr 2025 06:24 )             36.9     04-23    143  |  110[H]  |  16.7  ----------------------------<  91  3.7   |  20.0[L]  |  0.78    Ca    9.3      23 Apr 2025 06:24  Phos  2.8     04-23  Mg     2.2     04-23    TPro  6.8  /  Alb  3.6  /  TBili  <0.2[L]  /  DBili  x   /  AST  14  /  ALT  9   /  AlkPhos  95  04-23          Urinalysis Basic - ( 23 Apr 2025 06:24 )    Color: x / Appearance: x / SG: x / pH: x  Gluc: 91 mg/dL / Ketone: x  / Bili: x / Urobili: x   Blood: x / Protein: x / Nitrite: x   Leuk Esterase: x / RBC: x / WBC x   Sq Epi: x / Non Sq Epi: x / Bacteria: x        CAPILLARY BLOOD GLUCOSE          Labs reviewed:    RADIOLOGY & ADDITIONAL TESTS:  Imaging Personally Reviewed:  Electrocardiogram Personally Reviewed:  
[facility] Division of Hospital Medicine  Virginia Zamudio MD   I'm reachable on [employer] Teams      Patient is a 64y old  Female who presents with a chief complaint of Hemoptysis (23 Apr 2025 10:30)      SUBJECTIVE / OVERNIGHT EVENTS:   Patient was seen and examined during rounds    Reports feeling fine  Heme onc recommended CT abdomen and MRI brain with contrast  Discussed with pulm and heme onc      12 points ROS negative except above    MEDICATIONS  (STANDING):  albuterol    90 MICROgram(s) HFA Inhaler 1 Puff(s) Inhalation every 4 hours  amLODIPine   Tablet 5 milliGRAM(s) Oral daily  azithromycin  IVPB 500 milliGRAM(s) IV Intermittent every 24 hours  azithromycin  IVPB      cefTRIAXone Injectable. 1000 milliGRAM(s) IV Push every 24 hours  cetirizine 10 milliGRAM(s) Oral daily  FLUoxetine 20 milliGRAM(s) Oral daily  fluticasone propionate/ salmeterol 100-50 MICROgram(s) Diskus 1 Dose(s) Inhalation two times a day  losartan 50 milliGRAM(s) Oral daily  methylPREDNISolone sodium succinate Injectable 40 milliGRAM(s) IV Push daily  metoprolol succinate ER 50 milliGRAM(s) Oral daily  montelukast 10 milliGRAM(s) Oral daily  pantoprazole    Tablet 40 milliGRAM(s) Oral before breakfast  topiramate 75 milliGRAM(s) Oral daily    MEDICATIONS  (PRN):  acetaminophen     Tablet .. 650 milliGRAM(s) Oral every 6 hours PRN Temp greater or equal to 38C (100.4F), Mild Pain (1 - 3)  albuterol    0.083% 2.5 milliGRAM(s) Nebulizer every 4 hours PRN Bronchospasm  aluminum hydroxide/magnesium hydroxide/simethicone Suspension 30 milliLiter(s) Oral every 4 hours PRN Dyspepsia  melatonin 3 milliGRAM(s) Oral at bedtime PRN Insomnia  ondansetron Injectable 4 milliGRAM(s) IV Push every 8 hours PRN Nausea and/or Vomiting  SUMAtriptan 50 milliGRAM(s) Oral two times a day PRN Migraine        I&O's Summary    22 Apr 2025 07:01  -  23 Apr 2025 07:00  --------------------------------------------------------  IN: 250 mL / OUT: 0 mL / NET: 250 mL    23 Apr 2025 07:01  -  23 Apr 2025 17:36  --------------------------------------------------------  IN: 950 mL / OUT: 0 mL / NET: 950 mL        PHYSICAL EXAM:  Vital Signs Last 24 Hrs  T(C): 36.7 (23 Apr 2025 16:21), Max: 36.7 (23 Apr 2025 16:21)  T(F): 98 (23 Apr 2025 16:21), Max: 98 (23 Apr 2025 16:21)  HR: 100 (23 Apr 2025 16:21) (73 - 100)  BP: 114/77 (23 Apr 2025 16:21) (105/68 - 120/81)  BP(mean): --  RR: 18 (23 Apr 2025 16:21) (17 - 18)  SpO2: 95% (23 Apr 2025 16:21) (95% - 99%)    Parameters below as of 23 Apr 2025 16:21  Patient On (Oxygen Delivery Method): room air        CONSTITUTIONAL: NAD, Not in acute distress  EYES:  EOMI, conjunctiva and sclera clear  ENMT: , neck supple , non-tender  RESPIRATORY: Normal respiratory effort; lungs are clear to auscultation bilaterally  CARDIOVASCULAR: S1S2 present  ABDOMEN: soft. bowel sound present  MUSCLOSKELETAL: ; no clubbing or cyanosis of digits;   PSYCH: A+O to person, place, and time; affect appropriate  NEUROLOGY: CN, motor and sensory intact   SKIN: No rashes; no palpable lesions  Extremity: No bilateral edema      LABS:                        11.7   10.52 )-----------( 272      ( 23 Apr 2025 06:24 )             36.9     04-23    143  |  110[H]  |  16.7  ----------------------------<  91  3.7   |  20.0[L]  |  0.78    Ca    9.3      23 Apr 2025 06:24  Phos  2.8     04-23  Mg     2.2     04-23    TPro  6.8  /  Alb  3.6  /  TBili  <0.2[L]  /  DBili  x   /  AST  14  /  ALT  9   /  AlkPhos  95  04-23    PT/INR - ( 22 Apr 2025 05:45 )   PT: 12.6 sec;   INR: 1.12 ratio         PTT - ( 22 Apr 2025 05:45 )  PTT:33.9 sec      Urinalysis Basic - ( 23 Apr 2025 06:24 )    Color: x / Appearance: x / SG: x / pH: x  Gluc: 91 mg/dL / Ketone: x  / Bili: x / Urobili: x   Blood: x / Protein: x / Nitrite: x   Leuk Esterase: x / RBC: x / WBC x   Sq Epi: x / Non Sq Epi: x / Bacteria: x        CAPILLARY BLOOD GLUCOSE          Labs reviewed:    RADIOLOGY & ADDITIONAL TESTS:  Imaging Personally Reviewed:  Electrocardiogram Personally Reviewed:  
[facility] Division of Hospital Medicine  Virginia Zamudio MD   I'm reachable on [employer] Teams      Patient is a 64y old  Female who presents with a chief complaint of Hemoptysis (21 Apr 2025 20:23)      SUBJECTIVE / OVERNIGHT EVENTS:   Patient was seen and examined during rounds    REports feeling better. Plan for bronchoscopy today      12 points ROS negative except above    MEDICATIONS  (STANDING):  albuterol    0.083% 2.5 milliGRAM(s) Nebulizer every 6 hours  albuterol    90 MICROgram(s) HFA Inhaler 1 Puff(s) Inhalation every 4 hours  amLODIPine   Tablet 5 milliGRAM(s) Oral daily  azithromycin  IVPB 500 milliGRAM(s) IV Intermittent every 24 hours  azithromycin  IVPB      cefTRIAXone Injectable. 1000 milliGRAM(s) IV Push every 24 hours  cetirizine 10 milliGRAM(s) Oral daily  FLUoxetine 20 milliGRAM(s) Oral daily  fluticasone propionate/ salmeterol 100-50 MICROgram(s) Diskus 1 Dose(s) Inhalation two times a day  losartan 50 milliGRAM(s) Oral daily  methylPREDNISolone sodium succinate Injectable 40 milliGRAM(s) IV Push daily  metoprolol succinate ER 50 milliGRAM(s) Oral daily  montelukast 10 milliGRAM(s) Oral daily  pantoprazole    Tablet 40 milliGRAM(s) Oral before breakfast  topiramate 75 milliGRAM(s) Oral daily    MEDICATIONS  (PRN):  acetaminophen     Tablet .. 650 milliGRAM(s) Oral every 6 hours PRN Temp greater or equal to 38C (100.4F), Mild Pain (1 - 3)  albuterol    0.083% 2.5 milliGRAM(s) Nebulizer every 4 hours PRN Bronchospasm  aluminum hydroxide/magnesium hydroxide/simethicone Suspension 30 milliLiter(s) Oral every 4 hours PRN Dyspepsia  melatonin 3 milliGRAM(s) Oral at bedtime PRN Insomnia  ondansetron Injectable 4 milliGRAM(s) IV Push every 8 hours PRN Nausea and/or Vomiting  SUMAtriptan 50 milliGRAM(s) Oral two times a day PRN Migraine        I&O's Summary      PHYSICAL EXAM:  Vital Signs Last 24 Hrs  T(C): 36.6 (22 Apr 2025 16:21), Max: 36.9 (21 Apr 2025 19:30)  T(F): 97.9 (22 Apr 2025 16:21), Max: 98.5 (21 Apr 2025 19:30)  HR: 102 (22 Apr 2025 16:21) (73 - 118)  BP: 131/90 (22 Apr 2025 16:21) (112/92 - 152/89)  BP(mean): --  RR: 18 (22 Apr 2025 16:21) (18 - 32)  SpO2: 98% (22 Apr 2025 16:21) (95% - 100%)    Parameters below as of 22 Apr 2025 16:21  Patient On (Oxygen Delivery Method): room air        CONSTITUTIONAL: NAD, Not in acute distress  EYES:  EOMI, conjunctiva and sclera clear  ENMT: , neck supple , non-tender  RESPIRATORY: Normal respiratory effort; lungs are clear to auscultation bilaterally  CARDIOVASCULAR: S1S2 present  ABDOMEN: soft. bowel sound present  MUSCLOSKELETAL: ; no clubbing or cyanosis of digits;   PSYCH: A+O to person, place, and time; affect appropriate  NEUROLOGY: CN, motor and sensory intact   SKIN: No rashes; no palpable lesions  Extremity: No bilateral edema      LABS:                        12.9   12.22 )-----------( 308      ( 22 Apr 2025 05:45 )             41.0     04-22    142  |  110[H]  |  11.6  ----------------------------<  92  3.8   |  18.0[L]  |  0.77    Ca    10.2      22 Apr 2025 05:45    TPro  7.9  /  Alb  4.1  /  TBili  0.3[L]  /  DBili  x   /  AST  22  /  ALT  10  /  AlkPhos  110  04-22    PT/INR - ( 22 Apr 2025 05:45 )   PT: 12.6 sec;   INR: 1.12 ratio         PTT - ( 22 Apr 2025 05:45 )  PTT:33.9 sec      Urinalysis Basic - ( 22 Apr 2025 05:45 )    Color: x / Appearance: x / SG: x / pH: x  Gluc: 92 mg/dL / Ketone: x  / Bili: x / Urobili: x   Blood: x / Protein: x / Nitrite: x   Leuk Esterase: x / RBC: x / WBC x   Sq Epi: x / Non Sq Epi: x / Bacteria: x        CAPILLARY BLOOD GLUCOSE          Labs reviewed:    RADIOLOGY & ADDITIONAL TESTS:  Imaging Personally Reviewed:  Electrocardiogram Personally Reviewed:

## 2025-04-24 NOTE — PROGRESS NOTE ADULT - ASSESSMENT
64 year old female with PMH of HTN, HLD, asthma, breast cyst s/p resection, allergy and RA on leflunomide presenting with worsening cough and hemoptysis. She was admitted to hospitalist for lung mass management.    # Right Lung mass  #Asthma with acute exacerbation  -Presented with acute on chronic worsening cough with hemoptysis and CT angio showed right lung mass pressing bronchus  -CT surgery and pulmonology consulted  -S/p bronchoscopy on 04/22  -CT Absomen and pelvis reported no metastasis  -MRI brain wtih sedation pending  -Continue IV solumedrol  -Albuterol PRN  -Ceftriaxone and Azithromycin to cover superimposed pneumonia  - strep, legionella and sputum culture    # Pre-op evaluation  -H/o HTN and RA is undergoing bronchscopy and EBUS for right hilar mass  -TTE unremarkable, No sign of ischemia or CHF.   -RCRI 1 and exercise capacity more than 4 Mets.   -Cardiology recommended no intervention  -She is medically optimized to undergo bronchoscopy without any further cardiopulmonary intervention.    # HTN  -Amlodipine and losartan  -Continue metoprolol    # RA  -Hold leflunomide    # Allergy  -continue allegra    DVT Ppx- Lovenox    Dispo- Discharge in tomorrow if improve from anesthesia  
64 year old female with PMH of HTN, HLD, asthma, breast cyst s/p resection, allergy and RA on leflunomide presenting with worsening cough and hemoptysis. She was admitted to hospitalist for lung mass management.    # Right Lung mass  #Asthma with acute exacerbation  -Presented with acute on chronic worsening cough with hemoptysis and CT angio showed right lung mass pressing bronchus  -CT surgery and pulmonology consulted  -S/p bronchoscopy on 04/22  -Continue IV solumedrol  -Albuterol PRN  -Ceftriaxone and Azithromycin to cover superimposed pneumonia  - strep, legionella and sputum culture    # Pre-op evaluation  -H/o HTN and RA is undergoing bronchscopy and EBUS for right hilar mass  -TTE unremarkable, No sign of ischemia or CHF.   -RCRI 1 and exercise capacity more than 4 Mets.   -Cardiology recommended no intervention  -She is medically optimized to undergo bronchoscopy without any further cardiopulmonary intervention.    # HTN  -Amlodipine and losartan  -Continue metoprolol    # RA  -Hold leflunomide    # Allergy  -continue allegra    DVT Ppx- Lovenox    Dispo- Discharge in 2-3 days MRI brain and CT abdomen  
64 year old female with PMH of HTN, HLD, asthma, breast cyst s/p resection, allergy and RA on leflunomide presenting with worsening cough and hemoptysis. She was admitted to hospitalist for lung mass management.    # Right Lung mass  #Asthma with acute exacerbation  -Presented with acute on chronic worsening cough with hemoptysis and CT angio showed right lung mass pressing bronchus  -CT surgery and pulmonology consulted  -S/p bronchoscopy on 04/22  -Continue IV solumedrol  -Albuterol PRN  -Ceftriaxone and Azithromycin to cover superimposed pneumonia  - strep, legionella and sputum culture    # Pre-op evaluation  -H/o HTN and RA is undergoing bronchscopy and EBUS for right hilar mass  -TTE unremarkable, No sign of ischemia or CHF.   -RCRI 1 and exercise capacity more than 4 Mets.   -Cardiology recommended no intervention  -She is medically optimized to undergo bronchoscopy without any further cardiopulmonary intervention.    # HTN  -Amlodipine and losartan  -Continue metoprolol    # RA  -Hold leflunomide    # Allergy  -continue allegra    DVT Ppx- hold chemical PPX in prepration of  biopsy tomorrow    Dispo- Possible discharge in 1-2 days after lung biopsy and improvement in symptoms

## 2025-04-24 NOTE — PROGRESS NOTE ADULT - TIME BILLING
For chart review, face to face evaluation, counselling and care coordination

## 2025-04-25 ENCOUNTER — TRANSCRIPTION ENCOUNTER (OUTPATIENT)
Age: 65
End: 2025-04-25

## 2025-04-25 VITALS
RESPIRATION RATE: 18 BRPM | DIASTOLIC BLOOD PRESSURE: 81 MMHG | OXYGEN SATURATION: 98 % | HEART RATE: 95 BPM | SYSTOLIC BLOOD PRESSURE: 120 MMHG | TEMPERATURE: 98 F

## 2025-04-25 PROCEDURE — 88112 CYTOPATH CELL ENHANCE TECH: CPT

## 2025-04-25 PROCEDURE — 36415 COLL VENOUS BLD VENIPUNCTURE: CPT

## 2025-04-25 PROCEDURE — 80053 COMPREHEN METABOLIC PANEL: CPT

## 2025-04-25 PROCEDURE — 96374 THER/PROPH/DIAG INJ IV PUSH: CPT

## 2025-04-25 PROCEDURE — 86900 BLOOD TYPING SEROLOGIC ABO: CPT

## 2025-04-25 PROCEDURE — 85027 COMPLETE CBC AUTOMATED: CPT

## 2025-04-25 PROCEDURE — 88341 IMHCHEM/IMCYTCHM EA ADD ANTB: CPT

## 2025-04-25 PROCEDURE — 85025 COMPLETE CBC W/AUTO DIFF WBC: CPT

## 2025-04-25 PROCEDURE — 88172 CYTP DX EVAL FNA 1ST EA SITE: CPT

## 2025-04-25 PROCEDURE — 84100 ASSAY OF PHOSPHORUS: CPT

## 2025-04-25 PROCEDURE — 87641 MR-STAPH DNA AMP PROBE: CPT

## 2025-04-25 PROCEDURE — 88360 TUMOR IMMUNOHISTOCHEM/MANUAL: CPT

## 2025-04-25 PROCEDURE — 70553 MRI BRAIN STEM W/O & W/DYE: CPT | Mod: MC

## 2025-04-25 PROCEDURE — 88173 CYTOPATH EVAL FNA REPORT: CPT

## 2025-04-25 PROCEDURE — 85730 THROMBOPLASTIN TIME PARTIAL: CPT

## 2025-04-25 PROCEDURE — 85610 PROTHROMBIN TIME: CPT

## 2025-04-25 PROCEDURE — 99285 EMERGENCY DEPT VISIT HI MDM: CPT | Mod: 25

## 2025-04-25 PROCEDURE — 88305 TISSUE EXAM BY PATHOLOGIST: CPT

## 2025-04-25 PROCEDURE — 88342 IMHCHEM/IMCYTCHM 1ST ANTB: CPT

## 2025-04-25 PROCEDURE — 99239 HOSP IP/OBS DSCHRG MGMT >30: CPT

## 2025-04-25 PROCEDURE — 94640 AIRWAY INHALATION TREATMENT: CPT

## 2025-04-25 PROCEDURE — 87640 STAPH A DNA AMP PROBE: CPT

## 2025-04-25 PROCEDURE — 93306 TTE W/DOPPLER COMPLETE: CPT

## 2025-04-25 PROCEDURE — 86850 RBC ANTIBODY SCREEN: CPT

## 2025-04-25 PROCEDURE — 71275 CT ANGIOGRAPHY CHEST: CPT | Mod: MC

## 2025-04-25 PROCEDURE — 93005 ELECTROCARDIOGRAM TRACING: CPT

## 2025-04-25 PROCEDURE — 87899 AGENT NOS ASSAY W/OPTIC: CPT

## 2025-04-25 PROCEDURE — 88104 CYTOPATH FL NONGYN SMEARS: CPT

## 2025-04-25 PROCEDURE — 86901 BLOOD TYPING SEROLOGIC RH(D): CPT

## 2025-04-25 PROCEDURE — 74177 CT ABD & PELVIS W/CONTRAST: CPT | Mod: MC

## 2025-04-25 PROCEDURE — 83735 ASSAY OF MAGNESIUM: CPT

## 2025-04-25 PROCEDURE — 71045 X-RAY EXAM CHEST 1 VIEW: CPT

## 2025-04-25 RX ORDER — ONDANSETRON HCL/PF 4 MG/2 ML
1 VIAL (ML) INJECTION
Refills: 0
Start: 2025-04-25

## 2025-04-25 RX ORDER — AMOXICILLIN AND CLAVULANATE POTASSIUM 500; 125 MG/1; MG/1
1 TABLET, FILM COATED ORAL
Qty: 9 | Refills: 0
Start: 2025-04-25 | End: 2025-04-27

## 2025-04-25 RX ORDER — TRAMADOL HYDROCHLORIDE 50 MG/1
1 TABLET, FILM COATED ORAL
Refills: 0 | DISCHARGE

## 2025-04-25 RX ORDER — ONDANSETRON HCL/PF 4 MG/2 ML
1 VIAL (ML) INJECTION
Refills: 0 | DISCHARGE

## 2025-04-25 RX ORDER — AMOXICILLIN AND CLAVULANATE POTASSIUM 500; 125 MG/1; MG/1
1 TABLET, FILM COATED ORAL
Qty: 6 | Refills: 0
Start: 2025-04-25 | End: 2025-04-27

## 2025-04-25 RX ADMIN — METHYLPREDNISOLONE ACETATE 40 MILLIGRAM(S): 80 INJECTION, SUSPENSION INTRA-ARTICULAR; INTRALESIONAL; INTRAMUSCULAR; SOFT TISSUE at 05:14

## 2025-04-25 RX ADMIN — TOPIRAMATE 75 MILLIGRAM(S): 25 TABLET, FILM COATED ORAL at 11:31

## 2025-04-25 RX ADMIN — FLUOXETINE HYDROCHLORIDE 20 MILLIGRAM(S): 20 CAPSULE ORAL at 11:31

## 2025-04-25 RX ADMIN — AMLODIPINE BESYLATE 5 MILLIGRAM(S): 10 TABLET ORAL at 05:14

## 2025-04-25 RX ADMIN — LOSARTAN POTASSIUM 50 MILLIGRAM(S): 100 TABLET, FILM COATED ORAL at 05:14

## 2025-04-25 RX ADMIN — MONTELUKAST SODIUM 10 MILLIGRAM(S): 10 TABLET ORAL at 11:30

## 2025-04-25 RX ADMIN — Medication 40 MILLIGRAM(S): at 05:14

## 2025-04-25 RX ADMIN — Medication 30 MILLILITER(S): at 11:30

## 2025-04-25 RX ADMIN — Medication 10 MILLIGRAM(S): at 11:31

## 2025-04-25 RX ADMIN — Medication 30 MILLILITER(S): at 05:19

## 2025-04-25 RX ADMIN — METOPROLOL SUCCINATE 50 MILLIGRAM(S): 50 TABLET, EXTENDED RELEASE ORAL at 05:14

## 2025-04-25 RX ADMIN — Medication 1 DOSE(S): at 08:02

## 2025-04-25 NOTE — DISCHARGE NOTE NURSING/CASE MANAGEMENT/SOCIAL WORK - PATIENT PORTAL LINK FT
You can access the FollowMyHealth Patient Portal offered by [facility] by registering at the following website: [email or web address]. By joining [employer]’s FollowMyHealth portal, you will also be able to view your health information using other applications (apps) compatible with our system.

## 2025-04-25 NOTE — DIETITIAN INITIAL EVALUATION ADULT - OTHER INFO
63 yo Female admitted for right lung mass, asthma with acute exacerbation. PMH of HTN, HLD, asthma, breast cyst s/p resection, allergy and RA on leflunomide

## 2025-04-25 NOTE — DISCHARGE NOTE PROVIDER - ATTENDING DISCHARGE PHYSICAL EXAMINATION:
Vital Signs Last 24 Hrs  T(C): 36.3 (25 Apr 2025 11:26), Max: 36.8 (24 Apr 2025 19:36)  T(F): 97.4 (25 Apr 2025 11:26), Max: 98.2 (24 Apr 2025 19:36)  HR: 85 (25 Apr 2025 11:26) (79 - 88)  BP: 123/83 (25 Apr 2025 11:26) (100/63 - 123/83)  BP(mean): --  RR: 18 (25 Apr 2025 11:26) (17 - 18)  SpO2: 98% (25 Apr 2025 11:26) (96% - 98%)    Parameters below as of 25 Apr 2025 11:26  Patient On (Oxygen Delivery Method): room air    CONSTITUTIONAL: NAD, Not in acute distress  EYES:  EOMI, conjunctiva and sclera clear  ENMT: , neck supple , non-tender  RESPIRATORY: Normal respiratory effort; lungs are clear to auscultation bilaterally  CARDIOVASCULAR: S1S2 present  ABDOMEN: soft. bowel sound present  MUSCLOSKELETAL: ; no clubbing or cyanosis of digits;   PSYCH: A+O to person, place, and time; affect appropriate  NEUROLOGY: CN, motor and sensory intact   SKIN: No rashes; no palpable lesions  Extremity: No bilateral edema

## 2025-04-25 NOTE — DISCHARGE NOTE NURSING/CASE MANAGEMENT/SOCIAL WORK - NSDCFUADDAPPT_GEN_ALL_CORE_FT
f/u OP with Heme/ONC Dr. Mccain  [facility] [address] [id number] [phone]

## 2025-04-25 NOTE — DISCHARGE NOTE PROVIDER - NSDCFUSCHEDAPPT_GEN_ALL_CORE_FT
Yovani Jones  [facility] Physician [facility]  PULMMED [address]  Scheduled Appointment: 05/01/2025    [facility]  PULED [address]  Scheduled Appointment: 05/19/2025    Roosevelt Patel  [facility]  PULMMED [address]  Scheduled Appointment: 05/19/2025    Keyona Cox  [facility]  CARDIOLOGY [address]  Scheduled Appointment: 07/10/2025

## 2025-04-25 NOTE — DISCHARGE NOTE PROVIDER - HOSPITAL COURSE
64 year old female with PMH of HTN, HLD, asthma, breast cyst s/p resection, allergy and RA on leflunomide presenting with worsening cough and hemoptysis. She was admitted to hospitalist for lung mass management.    # Right Lung mass  #Asthma with acute exacerbation  -Presented with acute on chronic worsening cough with hemoptysis and CT angio showed right lung mass pressing bronchus  -CT surgery and pulmonology consulted  -S/p bronchoscopy on 04/22  -CT Abdomen and pelvis reported no metastasis  -MRI brain wtih sedation pending  -Continue IV solumedrol  -Albuterol PRN  -Ceftriaxone and Azithromycin to cover superimposed pneumonia  - strep, legionella and sputum culture    # Pre-op evaluation  -H/o HTN and RA is undergoing bronchscopy and EBUS for right hilar mass  -TTE unremarkable, No sign of ischemia or CHF.   -RCRI 1 and exercise capacity more than 4 Mets.   -Cardiology recommended no intervention  -She is medically optimized to undergo bronchoscopy without any further cardiopulmonary intervention.    # HTN  -Amlodipine and losartan  -Continue metoprolol    # RA  -Hold leflunomide    # Allergy  -continue allegra      Dispo: Stable to [place] Home with close F/U with Pulmonology, CT surgery, Oncology, and PCP 64 year old female with PMH of HTN, HLD, asthma, breast cyst s/p resection, allergy and RA on leflunomide presenting with worsening cough and hemoptysis. She was admitted to hospitalist for lung mass management.    # Right Lung mass  #Asthma with acute exacerbation  -Presented with acute on chronic worsening cough with hemoptysis and CT angio showed right lung mass pressing bronchus  -S/p bronchoscopy on 04/22. S/p biopsy taken. Mass is not resectable  -CT Abdomen and pelvis reported no metastasis  -MRI brain wtih sedation pending  -Dischrged home on 2 days of augmentin  -Follow up with thoracic surgeon and oncologist    # Pre-op evaluation  -H/o HTN and RA is undergoing bronchscopy and EBUS for right hilar mass  -TTE unremarkable, No sign of ischemia or CHF.   -RCRI 1 and exercise capacity more than 4 Mets.   -Cardiology recommended no intervention  -She is medically optimized to undergo bronchoscopy without any further cardiopulmonary intervention.    # HTN  -Amlodipine and losartan  -Continue metoprolol    # RA  -Hold leflunomide    # Allergy  -continue allegra

## 2025-04-25 NOTE — DISCHARGE NOTE PROVIDER - NSDCCPCAREPLAN_GEN_ALL_CORE_FT
PRINCIPAL DISCHARGE DIAGNOSIS  Diagnosis: Lung mass  Assessment and Plan of Treatment: S/p bronchoscopy EBUS for right hilar mass on 04/22, CT Abdomen and pelvis reported no metastasis. Please f/u w/Pulmonology, and Oncology      SECONDARY DISCHARGE DIAGNOSES  Diagnosis: Acute asthma exacerbation  Assessment and Plan of Treatment: Continue IV solumedrol, Albuterol PRN, Ceftriaxone and Azithromycin to cover superimposed pneumonia.  Please f/u w/Pulmonology    Diagnosis: RA (rheumatoid arthritis)  Assessment and Plan of Treatment: follow up w/PCP    Diagnosis: HTN (hypertension)  Assessment and Plan of Treatment: TTE unremarkable, No sign of ischemia or CHF. c/w home meds    Diagnosis: Hemoptysis  Assessment and Plan of Treatment: resolved

## 2025-04-25 NOTE — DISCHARGE NOTE PROVIDER - NSDCMRMEDTOKEN_GEN_ALL_CORE_FT
Allegra 180 mg oral tablet: 1 tab(s) orally once a day  amlodipine-olmesartan 5 mg-20 mg oral tablet: 1 tab(s) orally once a day  Breyna 160 mcg-4.5 mcg/inh inhalation aerosol: 2 inhaled 2 times a day  FLUoxetine 20 mg oral capsule: 1 cap(s) orally once a day  fluticasone 50 mcg/inh inhalation powder: 1 inhaled 2 times a day  gabapentin 100 mg oral tablet: 2 tab(s) orally once a day  leflunomide 20 mg oral tablet: 1 tab(s) orally once a day  metoprolol succinate 50 mg oral tablet, extended release: 1 tab(s) orally 2 times a day  omeprazole 40 mg oral delayed release capsule: orally  ondansetron 8 mg oral tablet, disintegrating: orally  Singulair 10 mg oral tablet: 1 tab(s) orally  SUMAtriptan 50 mg oral tablet: 1 tab(s) orally once a day  topiramate 25 mg oral tablet: 1 tab(s) orally 3 times a day  traMADol 50 mg oral tablet: 1 tab(s) orally 3 times a day as needed for  pain  Ventolin HFA 90 mcg/inh inhalation aerosol: 2 inhaled every 4 hours as needed for  shortness of breath and/or wheezing   Allegra 180 mg oral tablet: 1 tab(s) orally once a day  amlodipine-olmesartan 5 mg-20 mg oral tablet: 1 tab(s) orally once a day  amoxicillin-clavulanate 875 mg-125 mg oral tablet: 1 tab(s) orally every 8 hours  Breyna 160 mcg-4.5 mcg/inh inhalation aerosol: 2 inhaled 2 times a day  FLUoxetine 20 mg oral capsule: 1 cap(s) orally once a day  fluticasone 50 mcg/inh inhalation powder: 1 inhaled 2 times a day  gabapentin 100 mg oral tablet: 2 tab(s) orally once a day  leflunomide 20 mg oral tablet: 1 tab(s) orally once a day  metoprolol succinate 50 mg oral tablet, extended release: 1 tab(s) orally 2 times a day  omeprazole 40 mg oral delayed release capsule: orally  Singulair 10 mg oral tablet: 1 tab(s) orally once a day  SUMAtriptan 50 mg oral tablet: 1 tab(s) orally once a day  topiramate 25 mg oral tablet: 1 tab(s) orally 3 times a day  Ventolin HFA 90 mcg/inh inhalation aerosol: 2 inhaled every 4 hours as needed for  shortness of breath and/or wheezing   Allegra 180 mg oral tablet: 1 tab(s) orally once a day  amlodipine-olmesartan 5 mg-20 mg oral tablet: 1 tab(s) orally once a day  amoxicillin-clavulanate 875 mg-125 mg oral tablet: 1 tab(s) orally 2 times a day  Breyna 160 mcg-4.5 mcg/inh inhalation aerosol: 2 inhaled 2 times a day  FLUoxetine 20 mg oral capsule: 1 cap(s) orally once a day  fluticasone 50 mcg/inh inhalation powder: 1 inhaled 2 times a day  gabapentin 100 mg oral tablet: 2 tab(s) orally once a day  leflunomide 20 mg oral tablet: 1 tab(s) orally once a day  metoprolol succinate 50 mg oral tablet, extended release: 1 tab(s) orally 2 times a day  omeprazole 40 mg oral delayed release capsule: orally  Singulair 10 mg oral tablet: 1 tab(s) orally once a day  SUMAtriptan 50 mg oral tablet: 1 tab(s) orally once a day  topiramate 25 mg oral tablet: 1 tab(s) orally 3 times a day  Ventolin HFA 90 mcg/inh inhalation aerosol: 2 inhaled every 4 hours as needed for  shortness of breath and/or wheezing

## 2025-04-25 NOTE — DIETITIAN INITIAL EVALUATION ADULT - PERTINENT MEDS FT
MEDICATIONS  (STANDING):  azithromycin  IVPB      montelukast 10 milliGRAM(s) Oral daily  pantoprazole    Tablet 40 milliGRAM(s) Oral before breakfast  MEDICATIONS  (PRN):  ondansetron Injectable 4 milliGRAM(s) IV Push every 8 hours PRN Nausea and/or Vomiting  SUMAtriptan 50 milliGRAM(s) Oral two times a day PRN Migraine

## 2025-04-25 NOTE — DIETITIAN INITIAL EVALUATION ADULT - ORAL INTAKE PTA/DIET HISTORY
Pt with good PO intake. Consuming three main meals PTA. Food shopping and preparation is completed by daughter. Reports taking Vitamin D supplementation at home. Pt has partial lower and upper teeth, and denies any problems chewing or swallowing at this time. Pt with intentional weight loss in the past 6 months, reports UBW of 215 lbs. Weight loss attributed to changes lifestyle and dietary changes.

## 2025-04-25 NOTE — DISCHARGE NOTE PROVIDER - CARE PROVIDERS DIRECT ADDRESSES
,[email or web address],DirectAddress_Unknown,[email or web address]

## 2025-04-25 NOTE — DISCHARGE NOTE NURSING/CASE MANAGEMENT/SOCIAL WORK - FINANCIAL ASSISTANCE
[facility] provides services at a reduced cost to those who are determined to be eligible through [facility]’s financial assistance program. Information regarding [facility]’s financial assistance program can be found by going to [email or web address] or by calling [phone].

## 2025-04-25 NOTE — DIETITIAN INITIAL EVALUATION ADULT - ADD RECOMMEND
1. Continue with current prescribed diet Regular, as tolerated.  2. Recommend MVI daily.  3. Monitor diet tolerance & PO intake, weight/hydration status, nutrition-related labs, and skin.

## 2025-04-25 NOTE — DISCHARGE NOTE PROVIDER - PROVIDER TOKENS
PROVIDER:[TOKEN:[[id number]],SCHEDULEDAPPT:[05/19/2025]],FREE:[LAST:[Adán],FIRST:[Chantel Michelle],PHONE:[phone]],FAX:[(   )    -],ADDRESS:[Dr. Mccain  [address] [address] [phone] or [phone]],FOLLOWUP:[1 week]],PROVIDER:[TOKEN:[[id number]],FOLLOWUP:[Routine]]

## 2025-04-25 NOTE — DISCHARGE NOTE PROVIDER - CARE PROVIDER_API CALL
Odilon Faustin  Critical Care Medicine  [address] [place] [id number]  Phone: [phone]  Fax: [phone]  Scheduled Appointment: 05/19/2025    Chantel Mccain Dr.  [facility] [address] [place] [address] [phone] or [phone]  Phone: [phone]  Fax: (   )    -  Follow Up Time: 1 week    Nakul Ceja  Thoracic Surgery  [address] [place] [id number]  Phone: [phone]  Fax: [phone]  Follow Up Time: Routine

## 2025-04-29 PROBLEM — E78.5 HYPERLIPIDEMIA: Status: ACTIVE | Noted: 2025-04-29

## 2025-04-29 PROBLEM — M06.9 RHEUMATOID ARTHRITIS: Status: ACTIVE | Noted: 2025-04-29

## 2025-05-01 ENCOUNTER — APPOINTMENT (OUTPATIENT)
Dept: PULMONOLOGY | Facility: CLINIC | Age: 65
End: 2025-05-01
Payer: MEDICARE

## 2025-05-01 VITALS
HEART RATE: 72 BPM | OXYGEN SATURATION: 98 % | SYSTOLIC BLOOD PRESSURE: 130 MMHG | BODY MASS INDEX: 31.58 KG/M2 | DIASTOLIC BLOOD PRESSURE: 76 MMHG | HEIGHT: 64 IN | WEIGHT: 185 LBS | RESPIRATION RATE: 16 BRPM

## 2025-05-01 DIAGNOSIS — R05.9 COUGH, UNSPECIFIED: ICD-10-CM

## 2025-05-01 DIAGNOSIS — J45.41 MODERATE PERSISTENT ASTHMA WITH (ACUTE) EXACERBATION: ICD-10-CM

## 2025-05-01 DIAGNOSIS — R91.8 OTHER NONSPECIFIC ABNORMAL FINDING OF LUNG FIELD: ICD-10-CM

## 2025-05-01 PROCEDURE — 99496 TRANSJ CARE MGMT HIGH F2F 7D: CPT

## 2025-05-01 RX ORDER — PREDNISONE 20 MG/1
20 TABLET ORAL
Qty: 9 | Refills: 1 | Status: ACTIVE | COMMUNITY
Start: 2025-05-01 | End: 1900-01-01

## 2025-05-05 ENCOUNTER — APPOINTMENT (OUTPATIENT)
Dept: THORACIC SURGERY | Facility: CLINIC | Age: 65
End: 2025-05-05

## 2025-05-05 ENCOUNTER — EMERGENCY (EMERGENCY)
Facility: HOSPITAL | Age: 65
LOS: 1 days | End: 2025-05-05
Attending: EMERGENCY MEDICINE
Payer: MEDICARE

## 2025-05-05 VITALS
HEART RATE: 76 BPM | OXYGEN SATURATION: 98 % | HEIGHT: 64 IN | RESPIRATION RATE: 20 BRPM | SYSTOLIC BLOOD PRESSURE: 146 MMHG | DIASTOLIC BLOOD PRESSURE: 91 MMHG | TEMPERATURE: 98 F

## 2025-05-05 VITALS
RESPIRATION RATE: 18 BRPM | HEART RATE: 71 BPM | OXYGEN SATURATION: 95 % | SYSTOLIC BLOOD PRESSURE: 136 MMHG | DIASTOLIC BLOOD PRESSURE: 91 MMHG

## 2025-05-05 DIAGNOSIS — E78.5 HYPERLIPIDEMIA, UNSPECIFIED: ICD-10-CM

## 2025-05-05 LAB
ALBUMIN SERPL ELPH-MCNC: 3.9 G/DL — SIGNIFICANT CHANGE UP (ref 3.3–5.2)
ALP SERPL-CCNC: 127 U/L — HIGH (ref 40–120)
ALT FLD-CCNC: 40 U/L — HIGH
ANION GAP SERPL CALC-SCNC: 15 MMOL/L — SIGNIFICANT CHANGE UP (ref 5–17)
APPEARANCE UR: CLEAR — SIGNIFICANT CHANGE UP
AST SERPL-CCNC: 36 U/L — HIGH
BACTERIA # UR AUTO: NEGATIVE /HPF — SIGNIFICANT CHANGE UP
BASOPHILS # BLD AUTO: 0.04 K/UL — SIGNIFICANT CHANGE UP (ref 0–0.2)
BASOPHILS NFR BLD AUTO: 0.4 % — SIGNIFICANT CHANGE UP (ref 0–2)
BILIRUB SERPL-MCNC: 0.4 MG/DL — SIGNIFICANT CHANGE UP (ref 0.4–2)
BILIRUB UR-MCNC: NEGATIVE — SIGNIFICANT CHANGE UP
BUN SERPL-MCNC: 7.5 MG/DL — LOW (ref 8–20)
CALCIUM SERPL-MCNC: 9.7 MG/DL — SIGNIFICANT CHANGE UP (ref 8.4–10.5)
CAST: 0 /LPF — SIGNIFICANT CHANGE UP (ref 0–4)
CHLORIDE SERPL-SCNC: 106 MMOL/L — SIGNIFICANT CHANGE UP (ref 96–108)
CO2 SERPL-SCNC: 21 MMOL/L — LOW (ref 22–29)
COLOR SPEC: YELLOW — SIGNIFICANT CHANGE UP
CREAT SERPL-MCNC: 0.7 MG/DL — SIGNIFICANT CHANGE UP (ref 0.5–1.3)
DIFF PNL FLD: NEGATIVE — SIGNIFICANT CHANGE UP
EGFR: 96 ML/MIN/1.73M2 — SIGNIFICANT CHANGE UP
EGFR: 96 ML/MIN/1.73M2 — SIGNIFICANT CHANGE UP
EOSINOPHIL # BLD AUTO: 0.11 K/UL — SIGNIFICANT CHANGE UP (ref 0–0.5)
EOSINOPHIL NFR BLD AUTO: 1 % — SIGNIFICANT CHANGE UP (ref 0–6)
GLUCOSE SERPL-MCNC: 88 MG/DL — SIGNIFICANT CHANGE UP (ref 70–99)
GLUCOSE UR QL: NEGATIVE MG/DL — SIGNIFICANT CHANGE UP
HCT VFR BLD CALC: 42.2 % — SIGNIFICANT CHANGE UP (ref 34.5–45)
HGB BLD-MCNC: 13.2 G/DL — SIGNIFICANT CHANGE UP (ref 11.5–15.5)
IMM GRANULOCYTES # BLD AUTO: 0.02 K/UL — SIGNIFICANT CHANGE UP (ref 0–0.07)
IMM GRANULOCYTES NFR BLD AUTO: 0.2 % — SIGNIFICANT CHANGE UP (ref 0–0.9)
KETONES UR-MCNC: NEGATIVE MG/DL — SIGNIFICANT CHANGE UP
LEUKOCYTE ESTERASE UR-ACNC: ABNORMAL
LYMPHOCYTES # BLD AUTO: 3.07 K/UL — SIGNIFICANT CHANGE UP (ref 1–3.3)
LYMPHOCYTES NFR BLD AUTO: 28.1 % — SIGNIFICANT CHANGE UP (ref 13–44)
MCHC RBC-ENTMCNC: 27.3 PG — SIGNIFICANT CHANGE UP (ref 27–34)
MCHC RBC-ENTMCNC: 31.3 G/DL — LOW (ref 32–36)
MCV RBC AUTO: 87.2 FL — SIGNIFICANT CHANGE UP (ref 80–100)
MONOCYTES # BLD AUTO: 0.48 K/UL — SIGNIFICANT CHANGE UP (ref 0–0.9)
MONOCYTES NFR BLD AUTO: 4.4 % — SIGNIFICANT CHANGE UP (ref 2–14)
NEUTROPHILS # BLD AUTO: 7.22 K/UL — SIGNIFICANT CHANGE UP (ref 1.8–7.4)
NEUTROPHILS NFR BLD AUTO: 65.9 % — SIGNIFICANT CHANGE UP (ref 43–77)
NITRITE UR-MCNC: NEGATIVE — SIGNIFICANT CHANGE UP
NRBC # BLD AUTO: 0 K/UL — SIGNIFICANT CHANGE UP (ref 0–0)
NRBC # FLD: 0 K/UL — SIGNIFICANT CHANGE UP (ref 0–0)
NRBC BLD AUTO-RTO: 0 /100 WBCS — SIGNIFICANT CHANGE UP (ref 0–0)
PH UR: 7.5 — SIGNIFICANT CHANGE UP (ref 5–8)
PLATELET # BLD AUTO: 276 K/UL — SIGNIFICANT CHANGE UP (ref 150–400)
PMV BLD: 11.9 FL — SIGNIFICANT CHANGE UP (ref 7–13)
POTASSIUM SERPL-MCNC: 4.2 MMOL/L — SIGNIFICANT CHANGE UP (ref 3.5–5.3)
POTASSIUM SERPL-SCNC: 4.2 MMOL/L — SIGNIFICANT CHANGE UP (ref 3.5–5.3)
PROT SERPL-MCNC: 7.8 G/DL — SIGNIFICANT CHANGE UP (ref 6.6–8.7)
PROT UR-MCNC: NEGATIVE MG/DL — SIGNIFICANT CHANGE UP
RBC # BLD: 4.84 M/UL — SIGNIFICANT CHANGE UP (ref 3.8–5.2)
RBC # FLD: 15 % — HIGH (ref 10.3–14.5)
RBC CASTS # UR COMP ASSIST: 1 /HPF — SIGNIFICANT CHANGE UP (ref 0–4)
SODIUM SERPL-SCNC: 142 MMOL/L — SIGNIFICANT CHANGE UP (ref 135–145)
SP GR SPEC: 1.01 — SIGNIFICANT CHANGE UP (ref 1–1.03)
SQUAMOUS # UR AUTO: 3 /HPF — SIGNIFICANT CHANGE UP (ref 0–5)
UROBILINOGEN FLD QL: 1 MG/DL — SIGNIFICANT CHANGE UP (ref 0.2–1)
WBC # BLD: 10.94 K/UL — HIGH (ref 3.8–10.5)
WBC # FLD AUTO: 10.94 K/UL — HIGH (ref 3.8–10.5)
WBC UR QL: 5 /HPF — SIGNIFICANT CHANGE UP (ref 0–5)

## 2025-05-05 PROCEDURE — 71250 CT THORAX DX C-: CPT | Mod: MC

## 2025-05-05 PROCEDURE — 99284 EMERGENCY DEPT VISIT MOD MDM: CPT | Mod: 25

## 2025-05-05 PROCEDURE — 71250 CT THORAX DX C-: CPT | Mod: 26

## 2025-05-05 PROCEDURE — 74176 CT ABD & PELVIS W/O CONTRAST: CPT | Mod: 26

## 2025-05-05 PROCEDURE — 74176 CT ABD & PELVIS W/O CONTRAST: CPT | Mod: MC

## 2025-05-05 PROCEDURE — 85025 COMPLETE CBC W/AUTO DIFF WBC: CPT

## 2025-05-05 PROCEDURE — 99285 EMERGENCY DEPT VISIT HI MDM: CPT

## 2025-05-05 PROCEDURE — 80053 COMPREHEN METABOLIC PANEL: CPT

## 2025-05-05 PROCEDURE — 81001 URINALYSIS AUTO W/SCOPE: CPT

## 2025-05-05 PROCEDURE — 96374 THER/PROPH/DIAG INJ IV PUSH: CPT

## 2025-05-05 PROCEDURE — 36415 COLL VENOUS BLD VENIPUNCTURE: CPT

## 2025-05-05 RX ORDER — IBUPROFEN 200 MG
1 TABLET ORAL
Qty: 12 | Refills: 0
Start: 2025-05-05 | End: 2025-05-08

## 2025-05-05 RX ORDER — OXYCODONE HYDROCHLORIDE AND ACETAMINOPHEN 10; 325 MG/1; MG/1
1 TABLET ORAL
Qty: 12 | Refills: 0
Start: 2025-05-05 | End: 2025-05-07

## 2025-05-05 RX ADMIN — Medication 1000 MILLILITER(S): at 13:23

## 2025-05-05 RX ADMIN — Medication 4 MILLIGRAM(S): at 13:22

## 2025-05-05 NOTE — ED ADULT NURSE NOTE - OBJECTIVE STATEMENT
Assumed care of patient in iso5. Pt a&ox4 rr even and unlabored presents to ed with c.o of R sided flank pain. Reports recently diagnosed with Lung CA on the R sided. Denies chest pain, sob, fever, chills, gu/gi symptoms. Pt educated on plan of care, pt able to successfully teach back plan of care to RN, RN will continue to reeducate pt during hospital stay.

## 2025-05-05 NOTE — ED PROVIDER NOTE - PATIENT PORTAL LINK FT
You can access the FollowMyHealth Patient Portal offered by [facility] by registering at the following website: [email or web address]. By joining [employer]’s FollowMyHealth portal, you will also be able to view your health information using other applications (apps) compatible with our system.

## 2025-05-05 NOTE — ED ADULT TRIAGE NOTE - CHIEF COMPLAINT QUOTE
BIEBMS with c/o right flank pain x 1 week. worsens with movement. no urinary symptoms. recently diagnosed with R side lung ca. took tramadol 25mg at 0730.

## 2025-05-05 NOTE — ED PROVIDER NOTE - OBJECTIVE STATEMENT
64-year-old female comes in complaining of 3-day history of right flank pain it is nonradiating constant in the same place increasing over time.  No nausea vomiting or diarrhea no chest pain no diaphoresis patient's recently diagnosed with lung cancer.  On the right side.  No urinary complaints no fever.    Patient has a history of  asthma and rheumatoid arthritis.

## 2025-05-05 NOTE — ED PROVIDER NOTE - CLINICAL SUMMARY MEDICAL DECISION MAKING FREE TEXT BOX
64-year-old female comes in complaining of 3-day history of right flank pain it is nonradiating constant in the same place increasing over time.  No nausea vomiting or diarrhea no chest pain no diaphoresis patient's recently diagnosed with lung cancer.  On the right side.  No urinary complaints no fever.    Patient has a history of  asthma and rheumatoid arthritis.  vss   Physical exam patient is in no acute distress.  Neuro is nonfocal.  Moving all extremities.   neck is supple.  Lungs are clear.  Breath sounds are equal bilaterally.  Cardiac exam is within normal limits no murmurs rubs or gallops.  Abdomen is soft nontender.  Difficult to reproduce the pain with palpation.  Plan CT of abdomen chest.  Labs analgesia.  No findings on the CT that are new.  Possibly referred pain from mass in the right chest.

## 2025-05-06 LAB — NON-GYNECOLOGICAL CYTOLOGY STUDY: SIGNIFICANT CHANGE UP

## 2025-05-06 RX ORDER — BUDESONIDE AND FORMOTEROL FUMARATE 160; 4.5 UG/1; UG/1
160-4.5 AEROSOL, METERED RESPIRATORY (INHALATION) TWICE DAILY
Qty: 1 | Refills: 5 | Status: ACTIVE | COMMUNITY
Start: 2025-05-01 | End: 1900-01-01

## 2025-05-08 ENCOUNTER — OUTPATIENT (OUTPATIENT)
Dept: OUTPATIENT SERVICES | Facility: HOSPITAL | Age: 65
LOS: 1 days | Discharge: HOME | End: 2025-05-08

## 2025-05-08 DIAGNOSIS — R91.8 OTHER NONSPECIFIC ABNORMAL FINDING OF LUNG FIELD: ICD-10-CM

## 2025-05-13 ENCOUNTER — NON-APPOINTMENT (OUTPATIENT)
Age: 65
End: 2025-05-13

## 2025-05-14 ENCOUNTER — RESULT REVIEW (OUTPATIENT)
Age: 65
End: 2025-05-14

## 2025-05-14 ENCOUNTER — APPOINTMENT (OUTPATIENT)
Dept: HEMATOLOGY ONCOLOGY | Facility: CLINIC | Age: 65
End: 2025-05-14
Payer: MEDICARE

## 2025-05-14 VITALS
SYSTOLIC BLOOD PRESSURE: 152 MMHG | WEIGHT: 185 LBS | BODY MASS INDEX: 31.58 KG/M2 | OXYGEN SATURATION: 95 % | HEART RATE: 70 BPM | DIASTOLIC BLOOD PRESSURE: 93 MMHG | HEIGHT: 64 IN | TEMPERATURE: 98.3 F

## 2025-05-14 DIAGNOSIS — R91.8 OTHER NONSPECIFIC ABNORMAL FINDING OF LUNG FIELD: ICD-10-CM

## 2025-05-14 DIAGNOSIS — M06.9 RHEUMATOID ARTHRITIS, UNSPECIFIED: ICD-10-CM

## 2025-05-14 PROBLEM — C34.11 MALIGNANT NEOPLASM OF UPPER LOBE OF RIGHT LUNG: Status: ACTIVE | Noted: 2025-05-14

## 2025-05-14 LAB
ANISOCYTOSIS BLD QL: SLIGHT — SIGNIFICANT CHANGE UP
BASOPHILS # BLD AUTO: 0.06 K/UL — SIGNIFICANT CHANGE UP (ref 0–0.2)
BASOPHILS # BLD MANUAL: 0 K/UL — SIGNIFICANT CHANGE UP (ref 0–0.2)
BASOPHILS NFR BLD AUTO: 0.5 % — SIGNIFICANT CHANGE UP (ref 0–2)
BASOPHILS NFR BLD MANUAL: 0 % — SIGNIFICANT CHANGE UP (ref 0–2)
EOSINOPHIL # BLD AUTO: 0.25 K/UL — SIGNIFICANT CHANGE UP (ref 0–0.5)
EOSINOPHIL # BLD MANUAL: 0.36 K/UL — SIGNIFICANT CHANGE UP (ref 0–0.5)
EOSINOPHIL NFR BLD AUTO: 2.1 % — SIGNIFICANT CHANGE UP (ref 0–6)
EOSINOPHIL NFR BLD MANUAL: 3 % — SIGNIFICANT CHANGE UP (ref 0–6)
HCT VFR BLD CALC: 40.4 % — SIGNIFICANT CHANGE UP (ref 34.5–45)
HGB BLD-MCNC: 12.5 G/DL — SIGNIFICANT CHANGE UP (ref 11.5–15.5)
IMM GRANULOCYTES # BLD AUTO: 0.03 K/UL — SIGNIFICANT CHANGE UP (ref 0–0.07)
IMM GRANULOCYTES NFR BLD AUTO: 0.2 % — SIGNIFICANT CHANGE UP (ref 0–0.9)
LG PLATELETS BLD QL AUTO: SLIGHT — SIGNIFICANT CHANGE UP
LYMPHOCYTES # BLD AUTO: 5.19 K/UL — HIGH (ref 1–3.3)
LYMPHOCYTES # BLD MANUAL: 5.69 K/UL — HIGH (ref 1–3.3)
LYMPHOCYTES NFR BLD AUTO: 42.9 % — SIGNIFICANT CHANGE UP (ref 13–44)
LYMPHOCYTES NFR BLD MANUAL: 47 % — HIGH (ref 13–44)
MACROCYTES BLD QL: SLIGHT — SIGNIFICANT CHANGE UP
MANUAL SMEAR VERIFICATION: SIGNIFICANT CHANGE UP
MCHC RBC-ENTMCNC: 26.9 PG — LOW (ref 27–34)
MCHC RBC-ENTMCNC: 30.9 G/DL — LOW (ref 32–36)
MCV RBC AUTO: 87.1 FL — SIGNIFICANT CHANGE UP (ref 80–100)
MICROCYTES BLD QL: SLIGHT — SIGNIFICANT CHANGE UP
MONOCYTES # BLD AUTO: 0.73 K/UL — SIGNIFICANT CHANGE UP (ref 0–0.9)
MONOCYTES # BLD MANUAL: 1.45 K/UL — HIGH (ref 0–0.9)
MONOCYTES NFR BLD AUTO: 6 % — SIGNIFICANT CHANGE UP (ref 2–14)
MONOCYTES NFR BLD MANUAL: 12 % — SIGNIFICANT CHANGE UP (ref 2–14)
NEUTROPHILS # BLD AUTO: 5.85 K/UL — SIGNIFICANT CHANGE UP (ref 1.8–7.4)
NEUTROPHILS # BLD MANUAL: 4.6 K/UL — SIGNIFICANT CHANGE UP (ref 1.8–7.4)
NEUTROPHILS NFR BLD AUTO: 48.3 % — SIGNIFICANT CHANGE UP (ref 43–77)
NEUTROPHILS NFR BLD MANUAL: 38 % — LOW (ref 43–77)
NRBC # BLD AUTO: 0 K/UL — SIGNIFICANT CHANGE UP (ref 0–0)
NRBC # FLD: 0 K/UL — SIGNIFICANT CHANGE UP (ref 0–0)
NRBC BLD AUTO-RTO: 0 /100 WBCS — SIGNIFICANT CHANGE UP (ref 0–0)
PLAT MORPH BLD: NORMAL — SIGNIFICANT CHANGE UP
PLATELET # BLD AUTO: 233 K/UL — SIGNIFICANT CHANGE UP (ref 150–400)
PMV BLD: 11.8 FL — SIGNIFICANT CHANGE UP (ref 7–13)
RBC # BLD: 4.64 M/UL — SIGNIFICANT CHANGE UP (ref 3.8–5.2)
RBC # FLD: 15.3 % — HIGH (ref 10.3–14.5)
RBC BLD AUTO: SIGNIFICANT CHANGE UP
STOMATOCYTES BLD QL SMEAR: SLIGHT — SIGNIFICANT CHANGE UP
WBC # BLD: 12.11 K/UL — HIGH (ref 3.8–10.5)
WBC # FLD AUTO: 12.11 K/UL — HIGH (ref 3.8–10.5)

## 2025-05-14 PROCEDURE — G2211 COMPLEX E/M VISIT ADD ON: CPT

## 2025-05-14 PROCEDURE — 99215 OFFICE O/P EST HI 40 MIN: CPT

## 2025-05-14 PROCEDURE — G2212 PROLONG OUTPT/OFFICE VIS: CPT

## 2025-05-14 RX ORDER — ALPRAZOLAM 0.5 MG/1
0.5 TABLET ORAL
Qty: 2 | Refills: 0 | Status: ACTIVE | COMMUNITY
Start: 2025-05-14 | End: 1900-01-01

## 2025-05-16 LAB
ALBUMIN SERPL ELPH-MCNC: 4.1 G/DL
ALP BLD-CCNC: 128 U/L
ALT SERPL-CCNC: 22 U/L
ANION GAP SERPL CALC-SCNC: 18 MMOL/L
AST SERPL-CCNC: 17 U/L
BILIRUB SERPL-MCNC: 0.5 MG/DL
BUN SERPL-MCNC: 14 MG/DL
CALCIUM SERPL-MCNC: 9.8 MG/DL
CEA SERPL-MCNC: 1.4 NG/ML
CHLORIDE SERPL-SCNC: 108 MMOL/L
CO2 SERPL-SCNC: 21 MMOL/L
CREAT SERPL-MCNC: 0.9 MG/DL
EGFRCR SERPLBLD CKD-EPI 2021: 71 ML/MIN/1.73M2
GLUCOSE SERPL-MCNC: 76 MG/DL
HBV CORE IGG+IGM SER QL: NONREACTIVE
HBV SURFACE AB SER QL: NONREACTIVE
HBV SURFACE AG SER QL: NONREACTIVE
HCV AB SER QL: NONREACTIVE
HCV S/CO RATIO: 0.07 S/CO
POTASSIUM SERPL-SCNC: 3.8 MMOL/L
PROT SERPL-MCNC: 7.1 G/DL
SODIUM SERPL-SCNC: 148 MMOL/L

## 2025-05-18 ENCOUNTER — OUTPATIENT (OUTPATIENT)
Dept: OUTPATIENT SERVICES | Facility: HOSPITAL | Age: 65
LOS: 1 days | End: 2025-05-18

## 2025-05-18 DIAGNOSIS — C34.11 MALIGNANT NEOPLASM OF UPPER LOBE, RIGHT BRONCHUS OR LUNG: ICD-10-CM

## 2025-05-19 ENCOUNTER — APPOINTMENT (OUTPATIENT)
Dept: PULMONOLOGY | Facility: CLINIC | Age: 65
End: 2025-05-19

## 2025-05-21 ENCOUNTER — APPOINTMENT (OUTPATIENT)
Dept: RADIATION ONCOLOGY | Facility: CLINIC | Age: 65
End: 2025-05-21
Payer: MEDICARE

## 2025-05-21 VITALS
DIASTOLIC BLOOD PRESSURE: 85 MMHG | BODY MASS INDEX: 31.76 KG/M2 | OXYGEN SATURATION: 98 % | WEIGHT: 186 LBS | HEIGHT: 64 IN | SYSTOLIC BLOOD PRESSURE: 147 MMHG | RESPIRATION RATE: 16 BRPM | HEART RATE: 80 BPM

## 2025-05-21 DIAGNOSIS — Z87.891 PERSONAL HISTORY OF NICOTINE DEPENDENCE: ICD-10-CM

## 2025-05-21 DIAGNOSIS — Z80.0 FAMILY HISTORY OF MALIGNANT NEOPLASM OF DIGESTIVE ORGANS: ICD-10-CM

## 2025-05-21 DIAGNOSIS — Z56.0 UNEMPLOYMENT, UNSPECIFIED: ICD-10-CM

## 2025-05-21 DIAGNOSIS — Z63.5 DISRUPTION OF FAMILY BY SEPARATION AND DIVORCE: ICD-10-CM

## 2025-05-21 PROCEDURE — 99205 OFFICE O/P NEW HI 60 MIN: CPT

## 2025-05-21 PROCEDURE — G2211 COMPLEX E/M VISIT ADD ON: CPT

## 2025-05-21 SDOH — SOCIAL STABILITY - SOCIAL INSECURITY: DISRUPTION OF FAMILY BY SEPARATION AND DIVORCE: Z63.5

## 2025-05-21 SDOH — ECONOMIC STABILITY - INCOME SECURITY: UNEMPLOYMENT, UNSPECIFIED: Z56.0

## 2025-05-27 PROBLEM — R91.8 MASS OF RIGHT LUNG: Status: ACTIVE | Noted: 2025-05-27

## 2025-05-27 PROBLEM — R91.8 MASS OF RIGHT LUNG: Status: RESOLVED | Noted: 2025-04-29 | Resolved: 2025-05-27

## 2025-05-28 ENCOUNTER — APPOINTMENT (OUTPATIENT)
Dept: HEMATOLOGY ONCOLOGY | Facility: CLINIC | Age: 65
End: 2025-05-28

## 2025-05-28 VITALS
WEIGHT: 190.15 LBS | OXYGEN SATURATION: 96 % | TEMPERATURE: 97.9 F | DIASTOLIC BLOOD PRESSURE: 85 MMHG | SYSTOLIC BLOOD PRESSURE: 124 MMHG | HEIGHT: 64 IN | HEART RATE: 93 BPM | BODY MASS INDEX: 32.46 KG/M2

## 2025-05-28 PROCEDURE — 99215 OFFICE O/P EST HI 40 MIN: CPT

## 2025-05-28 PROCEDURE — G2211 COMPLEX E/M VISIT ADD ON: CPT

## 2025-05-28 PROCEDURE — G2212 PROLONG OUTPT/OFFICE VIS: CPT

## 2025-05-28 RX ORDER — ONDANSETRON 8 MG/1
8 TABLET ORAL
Qty: 90 | Refills: 2 | Status: ACTIVE | COMMUNITY
Start: 2025-05-28 | End: 1900-01-01

## 2025-05-28 RX ORDER — PROCHLORPERAZINE MALEATE 10 MG/1
10 TABLET ORAL EVERY 6 HOURS
Qty: 60 | Refills: 2 | Status: ACTIVE | COMMUNITY
Start: 2025-05-28 | End: 1900-01-01

## 2025-05-30 ENCOUNTER — APPOINTMENT (OUTPATIENT)
Dept: HEMATOLOGY ONCOLOGY | Facility: CLINIC | Age: 65
End: 2025-05-30

## 2025-06-02 ENCOUNTER — APPOINTMENT (OUTPATIENT)
Facility: CLINIC | Age: 65
End: 2025-06-02

## 2025-06-02 ENCOUNTER — APPOINTMENT (OUTPATIENT)
Facility: CLINIC | Age: 65
End: 2025-06-02
Payer: MEDICARE

## 2025-06-02 DIAGNOSIS — R91.8 OTHER NONSPECIFIC ABNORMAL FINDING OF LUNG FIELD: ICD-10-CM

## 2025-06-02 PROCEDURE — 99214 OFFICE O/P EST MOD 30 MIN: CPT | Mod: 93

## 2025-06-03 ENCOUNTER — RESULT REVIEW (OUTPATIENT)
Age: 65
End: 2025-06-03

## 2025-06-03 ENCOUNTER — APPOINTMENT (OUTPATIENT)
Dept: RHEUMATOLOGY | Facility: CLINIC | Age: 65
End: 2025-06-03
Payer: MEDICARE

## 2025-06-03 ENCOUNTER — APPOINTMENT (OUTPATIENT)
Age: 65
End: 2025-06-03

## 2025-06-03 VITALS
SYSTOLIC BLOOD PRESSURE: 124 MMHG | DIASTOLIC BLOOD PRESSURE: 85 MMHG | HEIGHT: 64 IN | WEIGHT: 185 LBS | OXYGEN SATURATION: 96 % | HEART RATE: 77 BPM | BODY MASS INDEX: 31.58 KG/M2

## 2025-06-03 DIAGNOSIS — Z51.11 ENCOUNTER FOR ANTINEOPLASTIC CHEMOTHERAPY: ICD-10-CM

## 2025-06-03 DIAGNOSIS — Z79.899 OTHER LONG TERM (CURRENT) DRUG THERAPY: ICD-10-CM

## 2025-06-03 DIAGNOSIS — C34.90 MALIGNANT NEOPLASM OF UNSPECIFIED PART OF UNSPECIFIED BRONCHUS OR LUNG: ICD-10-CM

## 2025-06-03 DIAGNOSIS — C34.11 MALIGNANT NEOPLASM OF UPPER LOBE, RIGHT BRONCHUS OR LUNG: ICD-10-CM

## 2025-06-03 DIAGNOSIS — M06.9 RHEUMATOID ARTHRITIS, UNSPECIFIED: ICD-10-CM

## 2025-06-03 DIAGNOSIS — R11.2 NAUSEA WITH VOMITING, UNSPECIFIED: ICD-10-CM

## 2025-06-03 LAB
BASOPHILS # BLD AUTO: 0.04 K/UL — SIGNIFICANT CHANGE UP (ref 0–0.2)
BASOPHILS NFR BLD AUTO: 0.4 % — SIGNIFICANT CHANGE UP (ref 0–2)
EOSINOPHIL # BLD AUTO: 0.22 K/UL — SIGNIFICANT CHANGE UP (ref 0–0.5)
EOSINOPHIL NFR BLD AUTO: 2.4 % — SIGNIFICANT CHANGE UP (ref 0–6)
HCT VFR BLD CALC: 38.4 % — SIGNIFICANT CHANGE UP (ref 34.5–45)
HGB BLD-MCNC: 11.6 G/DL — SIGNIFICANT CHANGE UP (ref 11.5–15.5)
IMM GRANULOCYTES # BLD AUTO: 0.02 K/UL — SIGNIFICANT CHANGE UP (ref 0–0.07)
IMM GRANULOCYTES NFR BLD AUTO: 0.2 % — SIGNIFICANT CHANGE UP (ref 0–0.9)
LYMPHOCYTES # BLD AUTO: 2.98 K/UL — SIGNIFICANT CHANGE UP (ref 1–3.3)
LYMPHOCYTES NFR BLD AUTO: 32.6 % — SIGNIFICANT CHANGE UP (ref 13–44)
MCHC RBC-ENTMCNC: 26.7 PG — LOW (ref 27–34)
MCHC RBC-ENTMCNC: 30.2 G/DL — LOW (ref 32–36)
MCV RBC AUTO: 88.5 FL — SIGNIFICANT CHANGE UP (ref 80–100)
MONOCYTES # BLD AUTO: 0.6 K/UL — SIGNIFICANT CHANGE UP (ref 0–0.9)
MONOCYTES NFR BLD AUTO: 6.6 % — SIGNIFICANT CHANGE UP (ref 2–14)
NEUTROPHILS # BLD AUTO: 5.28 K/UL — SIGNIFICANT CHANGE UP (ref 1.8–7.4)
NEUTROPHILS NFR BLD AUTO: 57.8 % — SIGNIFICANT CHANGE UP (ref 43–77)
NRBC # BLD AUTO: 0 K/UL — SIGNIFICANT CHANGE UP (ref 0–0)
NRBC # FLD: 0 K/UL — SIGNIFICANT CHANGE UP (ref 0–0)
NRBC BLD AUTO-RTO: 0 /100 WBCS — SIGNIFICANT CHANGE UP (ref 0–0)
PLATELET # BLD AUTO: 227 K/UL — SIGNIFICANT CHANGE UP (ref 150–400)
PMV BLD: 11.2 FL — SIGNIFICANT CHANGE UP (ref 7–13)
RBC # BLD: 4.34 M/UL — SIGNIFICANT CHANGE UP (ref 3.8–5.2)
RBC # FLD: 14.6 % — HIGH (ref 10.3–14.5)
WBC # BLD: 9.14 K/UL — SIGNIFICANT CHANGE UP (ref 3.8–10.5)
WBC # FLD AUTO: 9.14 K/UL — SIGNIFICANT CHANGE UP (ref 3.8–10.5)

## 2025-06-03 PROCEDURE — 99204 OFFICE O/P NEW MOD 45 MIN: CPT

## 2025-06-03 RX ORDER — BENZONATATE 100 MG/1
100 CAPSULE ORAL
Refills: 0 | Status: ACTIVE | COMMUNITY

## 2025-06-03 RX ORDER — MONTELUKAST SODIUM 10 MG/1
1 TABLET ORAL
Qty: 0 | Refills: 0 | DISCHARGE

## 2025-06-04 ENCOUNTER — APPOINTMENT (OUTPATIENT)
Age: 65
End: 2025-06-04

## 2025-06-04 LAB
ALBUMIN SERPL ELPH-MCNC: 3.8 G/DL — SIGNIFICANT CHANGE UP (ref 3.3–5)
ALP SERPL-CCNC: 103 U/L — SIGNIFICANT CHANGE UP (ref 40–120)
ALT FLD-CCNC: 24 U/L — SIGNIFICANT CHANGE UP (ref 10–40)
ANION GAP SERPL CALC-SCNC: 13 MMOL/L — SIGNIFICANT CHANGE UP (ref 5–17)
AST SERPL-CCNC: 25 U/L — SIGNIFICANT CHANGE UP (ref 10–35)
BILIRUB SERPL-MCNC: 0.5 MG/DL — SIGNIFICANT CHANGE UP (ref 0.2–1.2)
BUN SERPL-MCNC: 12 MG/DL — SIGNIFICANT CHANGE UP (ref 7–23)
CALCIUM SERPL-MCNC: 9.2 MG/DL — SIGNIFICANT CHANGE UP (ref 8.4–10.5)
CEA SERPL-MCNC: 1.3 NG/ML — SIGNIFICANT CHANGE UP (ref 0–3.8)
CHLORIDE SERPL-SCNC: 110 MMOL/L — HIGH (ref 96–108)
CO2 SERPL-SCNC: 21 MMOL/L — LOW (ref 22–31)
CREAT SERPL-MCNC: 1.02 MG/DL — SIGNIFICANT CHANGE UP (ref 0.5–1.3)
EGFR: 61 ML/MIN/1.73M2 — SIGNIFICANT CHANGE UP
EGFR: 61 ML/MIN/1.73M2 — SIGNIFICANT CHANGE UP
GLUCOSE SERPL-MCNC: 77 MG/DL — SIGNIFICANT CHANGE UP (ref 70–99)
POTASSIUM SERPL-MCNC: 4.4 MMOL/L — SIGNIFICANT CHANGE UP (ref 3.5–5.3)
POTASSIUM SERPL-SCNC: 4.4 MMOL/L — SIGNIFICANT CHANGE UP (ref 3.5–5.3)
PROT SERPL-MCNC: 6.3 G/DL — SIGNIFICANT CHANGE UP (ref 6–8.3)
SODIUM SERPL-SCNC: 144 MMOL/L — SIGNIFICANT CHANGE UP (ref 135–145)

## 2025-06-05 ENCOUNTER — APPOINTMENT (OUTPATIENT)
Age: 65
End: 2025-06-05

## 2025-06-05 ENCOUNTER — NON-APPOINTMENT (OUTPATIENT)
Age: 65
End: 2025-06-05

## 2025-06-11 ENCOUNTER — NON-APPOINTMENT (OUTPATIENT)
Age: 65
End: 2025-06-11

## 2025-06-15 ENCOUNTER — NON-APPOINTMENT (OUTPATIENT)
Age: 65
End: 2025-06-15

## 2025-06-16 RX ORDER — LIDOCAINE 5% 700 MG/1
5 PATCH TOPICAL
Qty: 30 | Refills: 0 | Status: ACTIVE | COMMUNITY
Start: 2025-06-13

## 2025-06-19 ENCOUNTER — NON-APPOINTMENT (OUTPATIENT)
Age: 65
End: 2025-06-19

## 2025-06-19 VITALS
DIASTOLIC BLOOD PRESSURE: 82 MMHG | BODY MASS INDEX: 32.61 KG/M2 | SYSTOLIC BLOOD PRESSURE: 138 MMHG | HEART RATE: 104 BPM | WEIGHT: 191 LBS | RESPIRATION RATE: 16 BRPM | OXYGEN SATURATION: 93 % | HEIGHT: 64 IN

## 2025-06-20 ENCOUNTER — APPOINTMENT (OUTPATIENT)
Dept: ORTHOPEDIC SURGERY | Facility: CLINIC | Age: 65
End: 2025-06-20
Payer: MEDICARE

## 2025-06-20 VITALS
WEIGHT: 190 LBS | BODY MASS INDEX: 32.44 KG/M2 | SYSTOLIC BLOOD PRESSURE: 124 MMHG | HEIGHT: 64 IN | HEART RATE: 76 BPM | DIASTOLIC BLOOD PRESSURE: 84 MMHG

## 2025-06-20 PROBLEM — M47.816 LUMBAR SPONDYLOSIS: Status: ACTIVE | Noted: 2025-06-20

## 2025-06-20 PROCEDURE — 99204 OFFICE O/P NEW MOD 45 MIN: CPT

## 2025-06-20 RX ORDER — GABAPENTIN 300 MG/1
300 CAPSULE ORAL
Qty: 60 | Refills: 1 | Status: ACTIVE | COMMUNITY
Start: 2025-06-20 | End: 1900-01-01

## 2025-06-24 ENCOUNTER — RESULT REVIEW (OUTPATIENT)
Age: 65
End: 2025-06-24

## 2025-06-24 ENCOUNTER — APPOINTMENT (OUTPATIENT)
Age: 65
End: 2025-06-24

## 2025-06-24 ENCOUNTER — APPOINTMENT (OUTPATIENT)
Dept: HEMATOLOGY ONCOLOGY | Facility: CLINIC | Age: 65
End: 2025-06-24

## 2025-06-24 LAB
ALBUMIN SERPL ELPH-MCNC: 3.9 G/DL — SIGNIFICANT CHANGE UP (ref 3.3–5)
ALP SERPL-CCNC: 105 U/L — SIGNIFICANT CHANGE UP (ref 40–120)
ALT FLD-CCNC: 17 U/L — SIGNIFICANT CHANGE UP (ref 10–40)
ANION GAP SERPL CALC-SCNC: 13 MMOL/L — SIGNIFICANT CHANGE UP (ref 5–17)
ANISOCYTOSIS BLD QL: SLIGHT — SIGNIFICANT CHANGE UP
AST SERPL-CCNC: 28 U/L — SIGNIFICANT CHANGE UP (ref 10–35)
BASOPHILS # BLD AUTO: 0.04 K/UL — SIGNIFICANT CHANGE UP (ref 0–0.2)
BASOPHILS # BLD MANUAL: 0.07 K/UL — SIGNIFICANT CHANGE UP (ref 0–0.2)
BASOPHILS NFR BLD AUTO: 1.2 % — SIGNIFICANT CHANGE UP (ref 0–2)
BASOPHILS NFR BLD MANUAL: 2 % — SIGNIFICANT CHANGE UP (ref 0–2)
BILIRUB SERPL-MCNC: 0.3 MG/DL — SIGNIFICANT CHANGE UP (ref 0.2–1.2)
BUN SERPL-MCNC: 12 MG/DL — SIGNIFICANT CHANGE UP (ref 7–23)
CALCIUM SERPL-MCNC: 9.7 MG/DL — SIGNIFICANT CHANGE UP (ref 8.4–10.5)
CEA SERPL-MCNC: 1.4 NG/ML — SIGNIFICANT CHANGE UP (ref 0–3.8)
CHLORIDE SERPL-SCNC: 108 MMOL/L — SIGNIFICANT CHANGE UP (ref 96–108)
CO2 SERPL-SCNC: 21 MMOL/L — LOW (ref 22–31)
CREAT SERPL-MCNC: 0.84 MG/DL — SIGNIFICANT CHANGE UP (ref 0.5–1.3)
EGFR: 77 ML/MIN/1.73M2 — SIGNIFICANT CHANGE UP
EGFR: 77 ML/MIN/1.73M2 — SIGNIFICANT CHANGE UP
ELLIPTOCYTES BLD QL SMEAR: SLIGHT — SIGNIFICANT CHANGE UP
EOSINOPHIL # BLD AUTO: 0.09 K/UL — SIGNIFICANT CHANGE UP (ref 0–0.5)
EOSINOPHIL # BLD MANUAL: 0.07 K/UL — SIGNIFICANT CHANGE UP (ref 0–0.5)
EOSINOPHIL NFR BLD AUTO: 2.6 % — SIGNIFICANT CHANGE UP (ref 0–6)
EOSINOPHIL NFR BLD MANUAL: 2 % — SIGNIFICANT CHANGE UP (ref 0–6)
GLUCOSE SERPL-MCNC: 88 MG/DL — SIGNIFICANT CHANGE UP (ref 70–99)
HCT VFR BLD CALC: 37.3 % — SIGNIFICANT CHANGE UP (ref 34.5–45)
HGB BLD-MCNC: 11.4 G/DL — LOW (ref 11.5–15.5)
HYPOCHROMIA BLD QL: SLIGHT — SIGNIFICANT CHANGE UP
IMM GRANULOCYTES # BLD AUTO: 0.01 K/UL — SIGNIFICANT CHANGE UP (ref 0–0.07)
IMM GRANULOCYTES NFR BLD AUTO: 0.3 % — SIGNIFICANT CHANGE UP (ref 0–0.9)
LG PLATELETS BLD QL AUTO: SLIGHT — SIGNIFICANT CHANGE UP
LYMPHOCYTES # BLD AUTO: 1.45 K/UL — SIGNIFICANT CHANGE UP (ref 1–3.3)
LYMPHOCYTES # BLD MANUAL: 1.85 K/UL — SIGNIFICANT CHANGE UP (ref 1–3.3)
LYMPHOCYTES NFR BLD AUTO: 42.4 % — SIGNIFICANT CHANGE UP (ref 13–44)
LYMPHOCYTES NFR BLD MANUAL: 54 % — HIGH (ref 13–44)
MACROCYTES BLD QL: SLIGHT — SIGNIFICANT CHANGE UP
MANUAL SMEAR VERIFICATION: SIGNIFICANT CHANGE UP
MCHC RBC-ENTMCNC: 27.2 PG — SIGNIFICANT CHANGE UP (ref 27–34)
MCHC RBC-ENTMCNC: 30.6 G/DL — LOW (ref 32–36)
MCV RBC AUTO: 89 FL — SIGNIFICANT CHANGE UP (ref 80–100)
MICROCYTES BLD QL: SLIGHT — SIGNIFICANT CHANGE UP
MONOCYTES # BLD AUTO: 0.59 K/UL — SIGNIFICANT CHANGE UP (ref 0–0.9)
MONOCYTES # BLD MANUAL: 0.34 K/UL — SIGNIFICANT CHANGE UP (ref 0–0.9)
MONOCYTES NFR BLD AUTO: 17.3 % — HIGH (ref 2–14)
MONOCYTES NFR BLD MANUAL: 10 % — SIGNIFICANT CHANGE UP (ref 2–14)
NEUTROPHILS # BLD AUTO: 1.24 K/UL — LOW (ref 1.8–7.4)
NEUTROPHILS # BLD MANUAL: 1.09 K/UL — LOW (ref 1.8–7.4)
NEUTROPHILS NFR BLD AUTO: 36.2 % — LOW (ref 43–77)
NEUTROPHILS NFR BLD MANUAL: 32 % — LOW (ref 43–77)
NRBC # BLD AUTO: 0 K/UL — SIGNIFICANT CHANGE UP (ref 0–0)
NRBC # FLD: 0 K/UL — SIGNIFICANT CHANGE UP (ref 0–0)
NRBC BLD AUTO-RTO: 0 /100 WBCS — SIGNIFICANT CHANGE UP (ref 0–0)
OVALOCYTES BLD QL SMEAR: SLIGHT — SIGNIFICANT CHANGE UP
PLAT MORPH BLD: NORMAL — SIGNIFICANT CHANGE UP
PLATELET # BLD AUTO: 206 K/UL — SIGNIFICANT CHANGE UP (ref 150–400)
PMV BLD: 10.4 FL — SIGNIFICANT CHANGE UP (ref 7–13)
POLYCHROMASIA BLD QL SMEAR: SLIGHT — SIGNIFICANT CHANGE UP
POTASSIUM SERPL-MCNC: 5 MMOL/L — SIGNIFICANT CHANGE UP (ref 3.5–5.3)
POTASSIUM SERPL-SCNC: 5 MMOL/L — SIGNIFICANT CHANGE UP (ref 3.5–5.3)
PROT SERPL-MCNC: 6.6 G/DL — SIGNIFICANT CHANGE UP (ref 6–8.3)
RBC # BLD: 4.19 M/UL — SIGNIFICANT CHANGE UP (ref 3.8–5.2)
RBC # FLD: 15 % — HIGH (ref 10.3–14.5)
RBC BLD AUTO: SIGNIFICANT CHANGE UP
SODIUM SERPL-SCNC: 142 MMOL/L — SIGNIFICANT CHANGE UP (ref 135–145)
STOMATOCYTES BLD QL SMEAR: SLIGHT — SIGNIFICANT CHANGE UP
WBC # BLD: 3.42 K/UL — LOW (ref 3.8–10.5)
WBC # FLD AUTO: 3.42 K/UL — LOW (ref 3.8–10.5)

## 2025-06-25 ENCOUNTER — RESULT REVIEW (OUTPATIENT)
Age: 65
End: 2025-06-25

## 2025-06-25 ENCOUNTER — APPOINTMENT (OUTPATIENT)
Age: 65
End: 2025-06-25

## 2025-06-25 ENCOUNTER — APPOINTMENT (OUTPATIENT)
Dept: HEMATOLOGY ONCOLOGY | Facility: CLINIC | Age: 65
End: 2025-06-25
Payer: MEDICARE

## 2025-06-25 LAB
ALBUMIN SERPL ELPH-MCNC: 4.1 G/DL — SIGNIFICANT CHANGE UP (ref 3.3–5)
ALP SERPL-CCNC: 107 U/L — SIGNIFICANT CHANGE UP (ref 40–120)
ALT FLD-CCNC: 18 U/L — SIGNIFICANT CHANGE UP (ref 10–40)
ANION GAP SERPL CALC-SCNC: 13 MMOL/L — SIGNIFICANT CHANGE UP (ref 5–17)
AST SERPL-CCNC: 26 U/L — SIGNIFICANT CHANGE UP (ref 10–35)
BILIRUB SERPL-MCNC: 0.3 MG/DL — SIGNIFICANT CHANGE UP (ref 0.2–1.2)
BUN SERPL-MCNC: 12 MG/DL — SIGNIFICANT CHANGE UP (ref 7–23)
CALCIUM SERPL-MCNC: 9.4 MG/DL — SIGNIFICANT CHANGE UP (ref 8.4–10.5)
CEA SERPL-MCNC: 1.5 NG/ML — SIGNIFICANT CHANGE UP (ref 0–3.8)
CHLORIDE SERPL-SCNC: 105 MMOL/L — SIGNIFICANT CHANGE UP (ref 96–108)
CO2 SERPL-SCNC: 22 MMOL/L — SIGNIFICANT CHANGE UP (ref 22–31)
CREAT SERPL-MCNC: 0.83 MG/DL — SIGNIFICANT CHANGE UP (ref 0.5–1.3)
CREAT SERPL-MCNC: 0.83 MG/DL — SIGNIFICANT CHANGE UP (ref 0.5–1.3)
EGFR: 78 ML/MIN/1.73M2 — SIGNIFICANT CHANGE UP
GLUCOSE SERPL-MCNC: 81 MG/DL — SIGNIFICANT CHANGE UP (ref 70–99)
POTASSIUM SERPL-MCNC: 4.5 MMOL/L — SIGNIFICANT CHANGE UP (ref 3.5–5.3)
POTASSIUM SERPL-SCNC: 4.5 MMOL/L — SIGNIFICANT CHANGE UP (ref 3.5–5.3)
PROT SERPL-MCNC: 7.1 G/DL — SIGNIFICANT CHANGE UP (ref 6–8.3)
SODIUM SERPL-SCNC: 141 MMOL/L — SIGNIFICANT CHANGE UP (ref 135–145)

## 2025-06-25 PROCEDURE — 99214 OFFICE O/P EST MOD 30 MIN: CPT

## 2025-06-26 ENCOUNTER — NON-APPOINTMENT (OUTPATIENT)
Age: 65
End: 2025-06-26

## 2025-06-26 ENCOUNTER — APPOINTMENT (OUTPATIENT)
Age: 65
End: 2025-06-26

## 2025-06-26 VITALS
HEART RATE: 72 BPM | RESPIRATION RATE: 16 BRPM | BODY MASS INDEX: 32.1 KG/M2 | WEIGHT: 188 LBS | SYSTOLIC BLOOD PRESSURE: 126 MMHG | OXYGEN SATURATION: 99 % | DIASTOLIC BLOOD PRESSURE: 76 MMHG | HEIGHT: 64 IN

## 2025-07-01 ENCOUNTER — APPOINTMENT (OUTPATIENT)
Dept: RHEUMATOLOGY | Facility: CLINIC | Age: 65
End: 2025-07-01

## 2025-07-02 ENCOUNTER — NON-APPOINTMENT (OUTPATIENT)
Age: 65
End: 2025-07-02

## 2025-07-02 VITALS
HEIGHT: 64 IN | TEMPERATURE: 99 F | DIASTOLIC BLOOD PRESSURE: 82 MMHG | HEART RATE: 81 BPM | SYSTOLIC BLOOD PRESSURE: 128 MMHG | WEIGHT: 191 LBS | RESPIRATION RATE: 16 BRPM | BODY MASS INDEX: 32.61 KG/M2 | OXYGEN SATURATION: 96 %

## 2025-07-07 ENCOUNTER — TRANSCRIPTION ENCOUNTER (OUTPATIENT)
Age: 65
End: 2025-07-07

## 2025-07-09 ENCOUNTER — NON-APPOINTMENT (OUTPATIENT)
Age: 65
End: 2025-07-09

## 2025-07-09 VITALS
BODY MASS INDEX: 32.79 KG/M2 | DIASTOLIC BLOOD PRESSURE: 92 MMHG | RESPIRATION RATE: 16 BRPM | HEART RATE: 80 BPM | OXYGEN SATURATION: 96 % | WEIGHT: 191 LBS | SYSTOLIC BLOOD PRESSURE: 151 MMHG

## 2025-07-10 ENCOUNTER — APPOINTMENT (OUTPATIENT)
Dept: CARDIOLOGY | Facility: CLINIC | Age: 65
End: 2025-07-10

## 2025-07-15 ENCOUNTER — OUTPATIENT (OUTPATIENT)
Dept: OUTPATIENT SERVICES | Facility: HOSPITAL | Age: 65
LOS: 1 days | Discharge: HOME | End: 2025-07-15

## 2025-07-15 DIAGNOSIS — R91.8 OTHER NONSPECIFIC ABNORMAL FINDING OF LUNG FIELD: ICD-10-CM

## 2025-07-17 ENCOUNTER — NON-APPOINTMENT (OUTPATIENT)
Age: 65
End: 2025-07-17

## 2025-07-17 VITALS
DIASTOLIC BLOOD PRESSURE: 91 MMHG | BODY MASS INDEX: 32.61 KG/M2 | OXYGEN SATURATION: 95 % | SYSTOLIC BLOOD PRESSURE: 140 MMHG | RESPIRATION RATE: 16 BRPM | WEIGHT: 190 LBS | HEART RATE: 76 BPM

## 2025-07-22 ENCOUNTER — APPOINTMENT (OUTPATIENT)
Dept: PULMONOLOGY | Facility: CLINIC | Age: 65
End: 2025-07-22

## 2025-07-23 ENCOUNTER — RESULT REVIEW (OUTPATIENT)
Age: 65
End: 2025-07-23

## 2025-07-23 ENCOUNTER — APPOINTMENT (OUTPATIENT)
Age: 65
End: 2025-07-23

## 2025-07-23 ENCOUNTER — APPOINTMENT (OUTPATIENT)
Dept: HEMATOLOGY ONCOLOGY | Facility: CLINIC | Age: 65
End: 2025-07-23
Payer: MEDICARE

## 2025-07-23 DIAGNOSIS — C34.11 MALIGNANT NEOPLASM OF UPPER LOBE, RIGHT BRONCHUS OR LUNG: ICD-10-CM

## 2025-07-23 LAB
ALBUMIN SERPL ELPH-MCNC: 3.8 G/DL — SIGNIFICANT CHANGE UP (ref 3.3–5)
ALP SERPL-CCNC: 77 U/L — SIGNIFICANT CHANGE UP (ref 40–120)
ALT FLD-CCNC: 16 U/L — SIGNIFICANT CHANGE UP (ref 10–40)
ANION GAP SERPL CALC-SCNC: 13 MMOL/L — SIGNIFICANT CHANGE UP (ref 5–17)
AST SERPL-CCNC: 27 U/L — SIGNIFICANT CHANGE UP (ref 10–35)
BASOPHILS # BLD AUTO: 0.02 K/UL — SIGNIFICANT CHANGE UP (ref 0–0.2)
BASOPHILS # BLD AUTO: 0.03 K/UL — SIGNIFICANT CHANGE UP (ref 0–0.2)
BASOPHILS NFR BLD AUTO: 0.4 % — SIGNIFICANT CHANGE UP (ref 0–2)
BASOPHILS NFR BLD AUTO: 0.6 % — SIGNIFICANT CHANGE UP (ref 0–2)
BILIRUB SERPL-MCNC: 0.3 MG/DL — SIGNIFICANT CHANGE UP (ref 0.2–1.2)
BUN SERPL-MCNC: 13 MG/DL — SIGNIFICANT CHANGE UP (ref 7–23)
CALCIUM SERPL-MCNC: 9.7 MG/DL — SIGNIFICANT CHANGE UP (ref 8.4–10.5)
CEA SERPL-MCNC: 1.8 NG/ML — SIGNIFICANT CHANGE UP (ref 0–3.8)
CHLORIDE SERPL-SCNC: 107 MMOL/L — SIGNIFICANT CHANGE UP (ref 96–108)
CO2 SERPL-SCNC: 22 MMOL/L — SIGNIFICANT CHANGE UP (ref 22–31)
CREAT SERPL-MCNC: 1.01 MG/DL — SIGNIFICANT CHANGE UP (ref 0.5–1.3)
EGFR: 61 ML/MIN/1.73M2 — SIGNIFICANT CHANGE UP
EGFR: 61 ML/MIN/1.73M2 — SIGNIFICANT CHANGE UP
EOSINOPHIL # BLD AUTO: 0.08 K/UL — SIGNIFICANT CHANGE UP (ref 0–0.5)
EOSINOPHIL # BLD AUTO: 0.09 K/UL — SIGNIFICANT CHANGE UP (ref 0–0.5)
EOSINOPHIL NFR BLD AUTO: 1.7 % — SIGNIFICANT CHANGE UP (ref 0–6)
EOSINOPHIL NFR BLD AUTO: 1.9 % — SIGNIFICANT CHANGE UP (ref 0–6)
GLUCOSE SERPL-MCNC: 80 MG/DL — SIGNIFICANT CHANGE UP (ref 70–99)
HCT VFR BLD CALC: 36 % — SIGNIFICANT CHANGE UP (ref 34.5–45)
HCT VFR BLD CALC: 36.1 % — SIGNIFICANT CHANGE UP (ref 34.5–45)
HGB BLD-MCNC: 11.2 G/DL — LOW (ref 11.5–15.5)
HGB BLD-MCNC: 11.3 G/DL — LOW (ref 11.5–15.5)
IMM GRANULOCYTES # BLD AUTO: 0.01 K/UL — SIGNIFICANT CHANGE UP (ref 0–0.07)
IMM GRANULOCYTES # BLD AUTO: 0.02 K/UL — SIGNIFICANT CHANGE UP (ref 0–0.07)
IMM GRANULOCYTES NFR BLD AUTO: 0.2 % — SIGNIFICANT CHANGE UP (ref 0–0.9)
IMM GRANULOCYTES NFR BLD AUTO: 0.4 % — SIGNIFICANT CHANGE UP (ref 0–0.9)
LYMPHOCYTES # BLD AUTO: 0.51 K/UL — LOW (ref 1–3.3)
LYMPHOCYTES # BLD AUTO: 0.58 K/UL — LOW (ref 1–3.3)
LYMPHOCYTES NFR BLD AUTO: 10.6 % — LOW (ref 13–44)
LYMPHOCYTES NFR BLD AUTO: 12.1 % — LOW (ref 13–44)
MCHC RBC-ENTMCNC: 28 PG — SIGNIFICANT CHANGE UP (ref 27–34)
MCHC RBC-ENTMCNC: 28.1 PG — SIGNIFICANT CHANGE UP (ref 27–34)
MCHC RBC-ENTMCNC: 31.1 G/DL — LOW (ref 32–36)
MCHC RBC-ENTMCNC: 31.3 G/DL — LOW (ref 32–36)
MCV RBC AUTO: 89.6 FL — SIGNIFICANT CHANGE UP (ref 80–100)
MCV RBC AUTO: 90.2 FL — SIGNIFICANT CHANGE UP (ref 80–100)
MONOCYTES # BLD AUTO: 0.58 K/UL — SIGNIFICANT CHANGE UP (ref 0–0.9)
MONOCYTES # BLD AUTO: 0.6 K/UL — SIGNIFICANT CHANGE UP (ref 0–0.9)
MONOCYTES NFR BLD AUTO: 12.1 % — SIGNIFICANT CHANGE UP (ref 2–14)
MONOCYTES NFR BLD AUTO: 12.5 % — SIGNIFICANT CHANGE UP (ref 2–14)
NEUTROPHILS # BLD AUTO: 3.53 K/UL — SIGNIFICANT CHANGE UP (ref 1.8–7.4)
NEUTROPHILS # BLD AUTO: 3.55 K/UL — SIGNIFICANT CHANGE UP (ref 1.8–7.4)
NEUTROPHILS NFR BLD AUTO: 73.3 % — SIGNIFICANT CHANGE UP (ref 43–77)
NEUTROPHILS NFR BLD AUTO: 74.2 % — SIGNIFICANT CHANGE UP (ref 43–77)
NRBC # BLD AUTO: 0 K/UL — SIGNIFICANT CHANGE UP (ref 0–0)
NRBC # BLD AUTO: 0 K/UL — SIGNIFICANT CHANGE UP (ref 0–0)
NRBC # FLD: 0 K/UL — SIGNIFICANT CHANGE UP (ref 0–0)
NRBC # FLD: 0 K/UL — SIGNIFICANT CHANGE UP (ref 0–0)
NRBC BLD AUTO-RTO: 0 /100 WBCS — SIGNIFICANT CHANGE UP (ref 0–0)
NRBC BLD AUTO-RTO: 0 /100 WBCS — SIGNIFICANT CHANGE UP (ref 0–0)
PLATELET # BLD AUTO: 172 K/UL — SIGNIFICANT CHANGE UP (ref 150–400)
PLATELET # BLD AUTO: 173 K/UL — SIGNIFICANT CHANGE UP (ref 150–400)
PMV BLD: 10.8 FL — SIGNIFICANT CHANGE UP (ref 7–13)
PMV BLD: 11.4 FL — SIGNIFICANT CHANGE UP (ref 7–13)
POTASSIUM SERPL-MCNC: 4.4 MMOL/L — SIGNIFICANT CHANGE UP (ref 3.5–5.3)
POTASSIUM SERPL-SCNC: 4.4 MMOL/L — SIGNIFICANT CHANGE UP (ref 3.5–5.3)
PROT SERPL-MCNC: 7.2 G/DL — SIGNIFICANT CHANGE UP (ref 6–8.3)
RBC # BLD: 3.99 M/UL — SIGNIFICANT CHANGE UP (ref 3.8–5.2)
RBC # BLD: 4.03 M/UL — SIGNIFICANT CHANGE UP (ref 3.8–5.2)
RBC # FLD: 16.2 % — HIGH (ref 10.3–14.5)
RBC # FLD: 16.4 % — HIGH (ref 10.3–14.5)
SODIUM SERPL-SCNC: 142 MMOL/L — SIGNIFICANT CHANGE UP (ref 135–145)
WBC # BLD: 4.79 K/UL — SIGNIFICANT CHANGE UP (ref 3.8–10.5)
WBC # BLD: 4.81 K/UL — SIGNIFICANT CHANGE UP (ref 3.8–10.5)
WBC # FLD AUTO: 4.79 K/UL — SIGNIFICANT CHANGE UP (ref 3.8–10.5)
WBC # FLD AUTO: 4.81 K/UL — SIGNIFICANT CHANGE UP (ref 3.8–10.5)

## 2025-07-23 PROCEDURE — 99214 OFFICE O/P EST MOD 30 MIN: CPT

## 2025-07-24 ENCOUNTER — NON-APPOINTMENT (OUTPATIENT)
Age: 65
End: 2025-07-24

## 2025-07-24 ENCOUNTER — APPOINTMENT (OUTPATIENT)
Age: 65
End: 2025-07-24

## 2025-07-24 DIAGNOSIS — C34.90 MALIGNANT NEOPLASM OF UNSPECIFIED PART OF UNSPECIFIED BRONCHUS OR LUNG: ICD-10-CM

## 2025-07-24 DIAGNOSIS — R11.2 NAUSEA WITH VOMITING, UNSPECIFIED: ICD-10-CM

## 2025-07-24 DIAGNOSIS — Z51.11 ENCOUNTER FOR ANTINEOPLASTIC CHEMOTHERAPY: ICD-10-CM

## 2025-07-25 ENCOUNTER — APPOINTMENT (OUTPATIENT)
Age: 65
End: 2025-07-25

## 2025-07-31 ENCOUNTER — NON-APPOINTMENT (OUTPATIENT)
Age: 65
End: 2025-07-31

## 2025-07-31 VITALS
RESPIRATION RATE: 16 BRPM | SYSTOLIC BLOOD PRESSURE: 140 MMHG | WEIGHT: 190 LBS | DIASTOLIC BLOOD PRESSURE: 88 MMHG | OXYGEN SATURATION: 96 % | HEART RATE: 72 BPM | BODY MASS INDEX: 32.61 KG/M2

## 2025-07-31 RX ORDER — SILVER SULFADIAZINE 10 MG/G
1 CREAM TOPICAL TWICE DAILY
Qty: 1 | Refills: 2 | Status: ACTIVE | COMMUNITY
Start: 2025-07-31 | End: 1900-01-01

## 2025-08-08 ENCOUNTER — NON-APPOINTMENT (OUTPATIENT)
Age: 65
End: 2025-08-08

## 2025-08-19 ENCOUNTER — RESULT REVIEW (OUTPATIENT)
Age: 65
End: 2025-08-19

## 2025-08-19 ENCOUNTER — APPOINTMENT (OUTPATIENT)
Dept: HEMATOLOGY ONCOLOGY | Facility: CLINIC | Age: 65
End: 2025-08-19

## 2025-08-19 ENCOUNTER — APPOINTMENT (OUTPATIENT)
Age: 65
End: 2025-08-19

## 2025-08-19 ENCOUNTER — APPOINTMENT (OUTPATIENT)
Dept: HEMATOLOGY ONCOLOGY | Facility: CLINIC | Age: 65
End: 2025-08-19
Payer: MEDICARE

## 2025-08-19 VITALS
WEIGHT: 191 LBS | SYSTOLIC BLOOD PRESSURE: 138 MMHG | BODY MASS INDEX: 32.61 KG/M2 | OXYGEN SATURATION: 97 % | HEIGHT: 64 IN | DIASTOLIC BLOOD PRESSURE: 87 MMHG | HEART RATE: 84 BPM

## 2025-08-19 DIAGNOSIS — Z51.11 ENCOUNTER FOR ANTINEOPLASTIC CHEMOTHERAPY: ICD-10-CM

## 2025-08-19 DIAGNOSIS — C34.11 MALIGNANT NEOPLASM OF UPPER LOBE, RIGHT BRONCHUS OR LUNG: ICD-10-CM

## 2025-08-19 DIAGNOSIS — M06.9 RHEUMATOID ARTHRITIS, UNSPECIFIED: ICD-10-CM

## 2025-08-19 LAB
ALBUMIN SERPL ELPH-MCNC: 3.9 G/DL — SIGNIFICANT CHANGE UP (ref 3.3–5)
ALP SERPL-CCNC: 75 U/L — SIGNIFICANT CHANGE UP (ref 40–120)
ALT FLD-CCNC: 23 U/L — SIGNIFICANT CHANGE UP (ref 10–40)
ANION GAP SERPL CALC-SCNC: 12 MMOL/L — SIGNIFICANT CHANGE UP (ref 5–17)
ANISOCYTOSIS BLD QL: SLIGHT — SIGNIFICANT CHANGE UP
AST SERPL-CCNC: 24 U/L — SIGNIFICANT CHANGE UP (ref 10–35)
BASO STIPL BLD QL SMEAR: PRESENT
BASOPHILS # BLD AUTO: 0.02 K/UL — SIGNIFICANT CHANGE UP (ref 0–0.2)
BASOPHILS # BLD MANUAL: 0 K/UL — SIGNIFICANT CHANGE UP (ref 0–0.2)
BASOPHILS NFR BLD AUTO: 0.3 % — SIGNIFICANT CHANGE UP (ref 0–2)
BASOPHILS NFR BLD MANUAL: 0 % — SIGNIFICANT CHANGE UP (ref 0–2)
BILIRUB SERPL-MCNC: 0.3 MG/DL — SIGNIFICANT CHANGE UP (ref 0.2–1.2)
BUN SERPL-MCNC: 14 MG/DL — SIGNIFICANT CHANGE UP (ref 7–23)
CALCIUM SERPL-MCNC: 10 MG/DL — SIGNIFICANT CHANGE UP (ref 8.4–10.5)
CEA SERPL-MCNC: 1.8 NG/ML — SIGNIFICANT CHANGE UP (ref 0–3.8)
CHLORIDE SERPL-SCNC: 105 MMOL/L — SIGNIFICANT CHANGE UP (ref 96–108)
CO2 SERPL-SCNC: 25 MMOL/L — SIGNIFICANT CHANGE UP (ref 22–31)
CREAT SERPL-MCNC: 0.9 MG/DL — SIGNIFICANT CHANGE UP (ref 0.5–1.3)
EGFR: 71 ML/MIN/1.73M2 — SIGNIFICANT CHANGE UP
EGFR: 71 ML/MIN/1.73M2 — SIGNIFICANT CHANGE UP
EOSINOPHIL # BLD AUTO: 0.05 K/UL — SIGNIFICANT CHANGE UP (ref 0–0.5)
EOSINOPHIL # BLD MANUAL: 0 K/UL — SIGNIFICANT CHANGE UP (ref 0–0.5)
EOSINOPHIL NFR BLD AUTO: 0.9 % — SIGNIFICANT CHANGE UP (ref 0–6)
EOSINOPHIL NFR BLD MANUAL: 0 % — SIGNIFICANT CHANGE UP (ref 0–6)
GLUCOSE SERPL-MCNC: 78 MG/DL — SIGNIFICANT CHANGE UP (ref 70–99)
HCT VFR BLD CALC: 36.3 % — SIGNIFICANT CHANGE UP (ref 34.5–45)
HGB BLD-MCNC: 11.3 G/DL — LOW (ref 11.5–15.5)
IMM GRANULOCYTES # BLD AUTO: 0.06 K/UL — SIGNIFICANT CHANGE UP (ref 0–0.07)
IMM GRANULOCYTES NFR BLD AUTO: 1 % — HIGH (ref 0–0.9)
LG PLATELETS BLD QL AUTO: SLIGHT — SIGNIFICANT CHANGE UP
LYMPHOCYTES # BLD AUTO: 1.45 K/UL — SIGNIFICANT CHANGE UP (ref 1–3.3)
LYMPHOCYTES # BLD MANUAL: 1.12 K/UL — SIGNIFICANT CHANGE UP (ref 1–3.3)
LYMPHOCYTES NFR BLD AUTO: 24.7 % — SIGNIFICANT CHANGE UP (ref 13–44)
LYMPHOCYTES NFR BLD MANUAL: 19 % — SIGNIFICANT CHANGE UP (ref 13–44)
MACROCYTES BLD QL: SLIGHT — SIGNIFICANT CHANGE UP
MANUAL NRBC #: 0.06 K/UL — HIGH (ref 0–0)
MANUAL SMEAR VERIFICATION: YES — SIGNIFICANT CHANGE UP
MCHC RBC-ENTMCNC: 28.3 PG — SIGNIFICANT CHANGE UP (ref 27–34)
MCHC RBC-ENTMCNC: 31.1 G/DL — LOW (ref 32–36)
MCV RBC AUTO: 91 FL — SIGNIFICANT CHANGE UP (ref 80–100)
MICROCYTES BLD QL: SLIGHT — SIGNIFICANT CHANGE UP
MONOCYTES # BLD AUTO: 0.76 K/UL — SIGNIFICANT CHANGE UP (ref 0–0.9)
MONOCYTES # BLD MANUAL: 0.71 K/UL — SIGNIFICANT CHANGE UP (ref 0–0.9)
MONOCYTES NFR BLD AUTO: 12.9 % — SIGNIFICANT CHANGE UP (ref 2–14)
MONOCYTES NFR BLD MANUAL: 12 % — SIGNIFICANT CHANGE UP (ref 2–14)
NEUTROPHILS # BLD AUTO: 3.54 K/UL — SIGNIFICANT CHANGE UP (ref 1.8–7.4)
NEUTROPHILS # BLD MANUAL: 4.06 K/UL — SIGNIFICANT CHANGE UP (ref 1.8–7.4)
NEUTROPHILS NFR BLD AUTO: 60.2 % — SIGNIFICANT CHANGE UP (ref 43–77)
NEUTROPHILS NFR BLD MANUAL: 69 % — SIGNIFICANT CHANGE UP (ref 43–77)
NRBC # BLD AUTO: 0 K/UL — SIGNIFICANT CHANGE UP (ref 0–0)
NRBC # BLD: 1 /100 WBCS — HIGH (ref 0–0)
NRBC # FLD: 0 K/UL — SIGNIFICANT CHANGE UP (ref 0–0)
NRBC BLD AUTO-RTO: 0 /100 WBCS — SIGNIFICANT CHANGE UP (ref 0–0)
NRBC BLD-RTO: 1 /100 WBCS — HIGH (ref 0–0)
OVALOCYTES BLD QL SMEAR: SLIGHT — SIGNIFICANT CHANGE UP
PLAT MORPH BLD: NORMAL — SIGNIFICANT CHANGE UP
PLATELET # BLD AUTO: 167 K/UL — SIGNIFICANT CHANGE UP (ref 150–400)
PMV BLD: 9.8 FL — SIGNIFICANT CHANGE UP (ref 7–13)
POIKILOCYTOSIS BLD QL AUTO: SLIGHT — SIGNIFICANT CHANGE UP
POLYCHROMASIA BLD QL SMEAR: SLIGHT — SIGNIFICANT CHANGE UP
POTASSIUM SERPL-MCNC: 4.4 MMOL/L — SIGNIFICANT CHANGE UP (ref 3.5–5.3)
POTASSIUM SERPL-SCNC: 4.4 MMOL/L — SIGNIFICANT CHANGE UP (ref 3.5–5.3)
PROT SERPL-MCNC: 7.4 G/DL — SIGNIFICANT CHANGE UP (ref 6–8.3)
RBC # BLD: 3.99 M/UL — SIGNIFICANT CHANGE UP (ref 3.8–5.2)
RBC # FLD: 17.9 % — HIGH (ref 10.3–14.5)
RBC BLD AUTO: SIGNIFICANT CHANGE UP
SODIUM SERPL-SCNC: 142 MMOL/L — SIGNIFICANT CHANGE UP (ref 135–145)
WBC # BLD: 5.88 K/UL — SIGNIFICANT CHANGE UP (ref 3.8–10.5)
WBC # FLD AUTO: 5.88 K/UL — SIGNIFICANT CHANGE UP (ref 3.8–10.5)

## 2025-08-19 PROCEDURE — 99215 OFFICE O/P EST HI 40 MIN: CPT

## 2025-08-19 PROCEDURE — G2211 COMPLEX E/M VISIT ADD ON: CPT

## 2025-08-20 ENCOUNTER — APPOINTMENT (OUTPATIENT)
Age: 65
End: 2025-08-20

## 2025-08-21 ENCOUNTER — APPOINTMENT (OUTPATIENT)
Age: 65
End: 2025-08-21

## 2025-08-27 ENCOUNTER — APPOINTMENT (OUTPATIENT)
Dept: HEMATOLOGY ONCOLOGY | Facility: CLINIC | Age: 65
End: 2025-08-27

## 2025-09-05 ENCOUNTER — APPOINTMENT (OUTPATIENT)
Dept: NUCLEAR MEDICINE | Facility: CLINIC | Age: 65
End: 2025-09-05
Payer: MEDICARE

## 2025-09-05 ENCOUNTER — OUTPATIENT (OUTPATIENT)
Dept: OUTPATIENT SERVICES | Facility: HOSPITAL | Age: 65
LOS: 1 days | End: 2025-09-05

## 2025-09-05 DIAGNOSIS — C34.11 MALIGNANT NEOPLASM OF UPPER LOBE, RIGHT BRONCHUS OR LUNG: ICD-10-CM

## 2025-09-05 PROCEDURE — 78815 PET IMAGE W/CT SKULL-THIGH: CPT | Mod: 26,PS

## 2025-09-09 ENCOUNTER — APPOINTMENT (OUTPATIENT)
Dept: HEMATOLOGY ONCOLOGY | Facility: CLINIC | Age: 65
End: 2025-09-09
Payer: MEDICARE

## 2025-09-09 VITALS
WEIGHT: 203.59 LBS | SYSTOLIC BLOOD PRESSURE: 150 MMHG | BODY MASS INDEX: 34.76 KG/M2 | DIASTOLIC BLOOD PRESSURE: 97 MMHG | OXYGEN SATURATION: 97 % | HEART RATE: 95 BPM | TEMPERATURE: 98 F | HEIGHT: 64 IN

## 2025-09-09 DIAGNOSIS — M06.9 RHEUMATOID ARTHRITIS, UNSPECIFIED: ICD-10-CM

## 2025-09-09 DIAGNOSIS — R91.8 OTHER NONSPECIFIC ABNORMAL FINDING OF LUNG FIELD: ICD-10-CM

## 2025-09-09 DIAGNOSIS — Z51.11 ENCOUNTER FOR ANTINEOPLASTIC CHEMOTHERAPY: ICD-10-CM

## 2025-09-09 PROCEDURE — G2211 COMPLEX E/M VISIT ADD ON: CPT

## 2025-09-09 PROCEDURE — 99215 OFFICE O/P EST HI 40 MIN: CPT

## 2025-09-18 ENCOUNTER — APPOINTMENT (OUTPATIENT)
Dept: RADIATION ONCOLOGY | Facility: CLINIC | Age: 65
End: 2025-09-18
Payer: MEDICARE

## 2025-09-18 VITALS
OXYGEN SATURATION: 100 % | DIASTOLIC BLOOD PRESSURE: 83 MMHG | SYSTOLIC BLOOD PRESSURE: 133 MMHG | BODY MASS INDEX: 34.67 KG/M2 | WEIGHT: 202 LBS | HEART RATE: 96 BPM | RESPIRATION RATE: 16 BRPM

## 2025-09-18 DIAGNOSIS — C34.11 MALIGNANT NEOPLASM OF UPPER LOBE, RIGHT BRONCHUS OR LUNG: ICD-10-CM

## 2025-09-18 PROCEDURE — 99024 POSTOP FOLLOW-UP VISIT: CPT

## 2025-09-18 RX ORDER — METHYLPREDNISOLONE 4 MG/1
4 TABLET ORAL
Qty: 1 | Refills: 0 | Status: ACTIVE | COMMUNITY
Start: 2025-09-18 | End: 1900-01-01

## (undated) DEVICE — VALVE SUCTION EVIS 160/200/240

## (undated) DEVICE — ADAPTER BIOPSY VALVE

## (undated) DEVICE — SOL IRR POUR NS 0.9% 1000ML

## (undated) DEVICE — TRAP SPECIMEN SPUTUM 40CC

## (undated) DEVICE — WARMING BLANKET LOWER ADULT

## (undated) DEVICE — GLV 7.5 PROTEXIS (WHITE)

## (undated) DEVICE — NDL ASPIRATION VIZISHOT2 21G

## (undated) DEVICE — BALLOON SINGLE FOR BF-UC160F

## (undated) DEVICE — TUBING SUCTION CONN 6FT STERILE

## (undated) DEVICE — VALVE BIOPSY BRONCHOVIDEOSCOPE

## (undated) DEVICE — BRUSH CYTO DISP

## (undated) DEVICE — DRAPE XL SHEET 77X98"

## (undated) DEVICE — DRAPE 3/4 SHEET 52X76"

## (undated) DEVICE — PACK BASIC GOWN MAYO COVER

## (undated) DEVICE — SYR CONTROL LUER LOK 10CC

## (undated) DEVICE — TUBING CONNECTING 6MM 20FT

## (undated) DEVICE — VENODYNE/SCD SLEEVE CALF MEDIUM

## (undated) DEVICE — VISITEC 4X4

## (undated) DEVICE — SOL INJ NS 0.9% 100ML

## (undated) DEVICE — STOPCOCK 3 WAY W SWIVEL MALE LUER LOCK

## (undated) DEVICE — SOL IRR POUR H2O 1000ML